# Patient Record
Sex: FEMALE | Race: WHITE | NOT HISPANIC OR LATINO | Employment: STUDENT | URBAN - METROPOLITAN AREA
[De-identification: names, ages, dates, MRNs, and addresses within clinical notes are randomized per-mention and may not be internally consistent; named-entity substitution may affect disease eponyms.]

---

## 2017-03-01 ENCOUNTER — TRANSFERRED RECORDS (OUTPATIENT)
Dept: HEALTH INFORMATION MANAGEMENT | Facility: CLINIC | Age: 22
End: 2017-03-01

## 2017-03-01 LAB — PAP SMEAR - HIM PATIENT REPORTED: NEGATIVE

## 2017-03-13 ENCOUNTER — OFFICE VISIT (OUTPATIENT)
Dept: URGENT CARE | Facility: URGENT CARE | Age: 22
End: 2017-03-13
Payer: COMMERCIAL

## 2017-03-13 VITALS
SYSTOLIC BLOOD PRESSURE: 108 MMHG | DIASTOLIC BLOOD PRESSURE: 54 MMHG | TEMPERATURE: 98.8 F | HEART RATE: 86 BPM | RESPIRATION RATE: 16 BRPM | OXYGEN SATURATION: 98 %

## 2017-03-13 DIAGNOSIS — J01.90 ACUTE SINUSITIS WITH SYMPTOMS > 10 DAYS: Primary | ICD-10-CM

## 2017-03-13 PROCEDURE — 99213 OFFICE O/P EST LOW 20 MIN: CPT | Performed by: PHYSICIAN ASSISTANT

## 2017-03-13 NOTE — NURSING NOTE
"Chief Complaint   Patient presents with     Urgent Care     Cough     with sinus drainage for 1 month     Fatigue      Initial /54  Pulse 86  Temp 98.8  F (37.1  C)  Resp 16  SpO2 98% Estimated body mass index is 27.55 kg/(m^2) as calculated from the following:    Height as of 6/26/16: 5' 2\" (1.575 m).    Weight as of 10/23/16: 150 lb 9.6 oz (68.3 kg)..  BP completed using cuff size: regular  S ZAINAB, CMA    "

## 2017-03-13 NOTE — MR AVS SNAPSHOT
After Visit Summary   3/13/2017    Clayton العلي    MRN: [id number]           Patient Information     Date Of Birth          1995        Visit Information        Provider Department      3/13/2017 6:30 PM Chiquita Samson PA-C [facility]        Today's Diagnoses     Acute sinusitis with symptoms > 10 days    -  1       Follow-ups after your visit        Who to contact     If you have questions or need follow up information about today's clinic visit or your schedule please contact [facility] directly at [phone].  Normal or non-critical lab and imaging results will be communicated to you by [employer], letter or phone within 4 business days after the clinic has received the results. If you do not hear from us within 7 days, please contact the clinic through [employer] or phone. If you have a critical or abnormal lab result, we will notify you by phone as soon as possible.  Submit refill requests through [employer] or call your pharmacy and they will forward the refill request to us. Please allow 3 business days for your refill to be completed.          Additional Information About Your Visit        MyChart Information     [employer] gives you secure access to your electronic health record. If you see a primary care provider, you can also send messages to your care team and make appointments. If you have questions, please call your primary care clinic.  If you do not have a primary care provider, please call [phone] and they will assist you.        Care EveryWhere ID     This is your Care EveryWhere ID. This could be used by other organizations to access your [place] medical records  [id number]        Your Vitals Were     Pulse Temperature Respirations Pulse Oximetry          86 98.8  F (37.1  C) 16 98%         Blood Pressure from Last 3 Encounters:   03/13/17 108/54   10/23/16 116/60   06/26/16 120/66    Weight from Last 3 Encounters:   10/23/16 150 lb 9.6  oz (68.3 kg)   06/26/16 150 lb (68 kg)   01/05/15 152 lb (68.9 kg) (84 %)*     * Growth percentiles are based on [facility] 2-20 Years data.              Today, you had the following     No orders found for display         Today's Medication Changes          These changes are accurate as of: 3/13/17 11:59 PM.  If you have any questions, ask your nurse or doctor.               Start taking these medicines.        Dose/Directions    amoxicillin-clavulanate 875-125 MG per tablet   Commonly known as:  AUGMENTIN   Used for:  Acute sinusitis with symptoms > 10 days   Started by:  Chiquita Samson PA-C        Dose:  1 tablet   Take 1 tablet by mouth 2 times daily   Quantity:  20 tablet   Refills:  0            Where to get your medicines      These medications were sent to [facility] - [address]   [address]  [address], [place] [address]     Phone:  [phone]     amoxicillin-clavulanate 875-125 MG per tablet                Primary Care Provider    Physician No Ref-Primary       No address on file        Thank you!     Thank you for choosing [facility]RG[facility]  for your care. Our goal is always to provide you with excellent care. Hearing back from our patients is one way we can continue to improve our services. Please take a few minutes to complete the written survey that you may receive in the mail after your visit with us. Thank you!             Your Updated Medication List - Protect others around you: Learn how to safely use, store and throw away your medicines at www.disposemymeds.org.          This list is accurate as of: 3/13/17 11:59 PM.  Always use your most recent med list.                   Brand Name Dispense Instructions for use    amoxicillin-clavulanate 875-125 MG per tablet    AUGMENTIN    20 tablet    Take 1 tablet by mouth 2 times daily       SERTRALINE HCL PO      Take 100 mg by mouth daily       sodium chloride 0.65 % nasal spray    OCEAN      Spray 1 spray into both nostrils daily as needed for congestion       SHALINI PO      Take 1 tablet by mouth At Bedtime

## 2017-03-30 NOTE — PROGRESS NOTES
SUBJECTIVE:  Clayton العلي is a 21 year old female here with concerns about sinus infection.  She states onset of symptoms were 1 month(s) ago.  She has had maxillary pressure. Course of illness is worsening. Severity moderate  Current and Associated symptoms: nasal congestion, rhinorrhea, facial pain/pressure, headache, post-nasal drainage, nausea and hot and cold flashes  Predisposing factors include none. Recent treatment has included: Decongestants and OTC meds    No past medical history on file.  Social History   Substance Use Topics     Smoking status: Never Smoker     Smokeless tobacco: Never Used     Alcohol use No       ROS:  Review of systems negative except as stated above.    OBJECTIVE:  /54  Pulse 86  Temp 98.8  F (37.1  C)  Resp 16  SpO2 98%  Exam:GENERAL APPEARANCE: healthy, alert and no distress  EYES: EOMI,  PERRL, conjunctiva clear  HENT: TM's normal bilaterally, rhinorrhea yellow, green, thick and purulent, oral mucous membranes moist, no erythema noted and maxillary sinus tenderness and thick PND noted.    NECK: supple, nontender, no lymphadenopathy  RESP: lungs clear to auscultation - no rales, rhonchi or wheezes  CV: regular rates and rhythm, normal S1 S2, no murmur noted  NEURO: Normal strength and tone, sensory exam grossly normal,  normal speech and mentation  SKIN: no suspicious lesions or rashes    assessment/plan:  (J01.90) Acute sinusitis with symptoms > 10 days  (primary encounter diagnosis)  Comment:   Plan: amoxicillin-clavulanate (AUGMENTIN) 875-125 MG         per tablet         Med as directed and OTC med for sx relief.  Hot packs to face, steam and increase fluids.  FU with PCP as needed if sx worsen or new sx develop.

## 2017-06-10 ENCOUNTER — HEALTH MAINTENANCE LETTER (OUTPATIENT)
Age: 22
End: 2017-06-10

## 2017-06-19 ENCOUNTER — OFFICE VISIT (OUTPATIENT)
Dept: INTERNAL MEDICINE | Facility: CLINIC | Age: 22
End: 2017-06-19
Payer: COMMERCIAL

## 2017-06-19 VITALS
SYSTOLIC BLOOD PRESSURE: 100 MMHG | DIASTOLIC BLOOD PRESSURE: 60 MMHG | TEMPERATURE: 98.3 F | HEART RATE: 84 BPM | BODY MASS INDEX: 28.91 KG/M2 | WEIGHT: 157.1 LBS | OXYGEN SATURATION: 99 % | HEIGHT: 62 IN

## 2017-06-19 DIAGNOSIS — J30.81 NON-SEASONAL ALLERGIC RHINITIS DUE TO ANIMAL HAIR AND DANDER: Primary | ICD-10-CM

## 2017-06-19 DIAGNOSIS — J30.2 SEASONAL ALLERGIC RHINITIS, UNSPECIFIED ALLERGIC RHINITIS TRIGGER: ICD-10-CM

## 2017-06-19 PROCEDURE — 99213 OFFICE O/P EST LOW 20 MIN: CPT | Performed by: INTERNAL MEDICINE

## 2017-06-19 RX ORDER — AZELASTINE HYDROCHLORIDE 0.5 MG/ML
1 SOLUTION/ DROPS OPHTHALMIC 2 TIMES DAILY
Qty: 1 BOTTLE | Refills: 11 | Status: SHIPPED | OUTPATIENT
Start: 2017-06-19 | End: 2023-02-13

## 2017-06-19 RX ORDER — AZELASTINE 1 MG/ML
2 SPRAY, METERED NASAL DAILY
Qty: 1 BOTTLE | Refills: 11 | Status: SHIPPED | OUTPATIENT
Start: 2017-06-19 | End: 2018-11-04

## 2017-06-19 RX ORDER — FLUTICASONE PROPIONATE 50 MCG
2 SPRAY, SUSPENSION (ML) NASAL DAILY
Qty: 1 BOTTLE | Refills: 11 | Status: SHIPPED | OUTPATIENT
Start: 2017-06-19 | End: 2018-11-04

## 2017-06-19 NOTE — PATIENT INSTRUCTIONS
Try switching to Allegra (fexofenadine) daily for next 2 weeks. Over the counter.     Azelastine (anti-histamine) nasal spray 2 sprays/nostril daily. Prescription sent to pharmacy.    Fluticasone (anti-inflammatory) nasal spray 2 sprays/nostril daily. Prescription sent to pharmacy.    Azelastine eye drops: 1 drop/eye twice a day. Prescription sent to pharmacy.    ---    If no improvement in symptoms after 2 weeks, let me know.    Next step is to add Singulair.

## 2017-06-19 NOTE — MR AVS SNAPSHOT
After Visit Summary   6/19/2017    Clayton العلي    MRN: [id number]           Patient Information     Date Of Birth          1995        Visit Information        Provider Department      6/19/2017 10:00 AM Syl Don MD [facility]        Today's Diagnoses     Non-seasonal allergic rhinitis due to animal hair and dander    -  1    Seasonal allergic rhinitis, unspecified allergic rhinitis trigger          Care Instructions    Try switching to Allegra (fexofenadine) daily for next 2 weeks. Over the counter.     Azelastine (anti-histamine) nasal spray 2 sprays/nostril daily. Prescription sent to pharmacy.    Fluticasone (anti-inflammatory) nasal spray 2 sprays/nostril daily. Prescription sent to pharmacy.    Azelastine eye drops: 1 drop/eye twice a day. Prescription sent to pharmacy.    ---    If no improvement in symptoms after 2 weeks, let me know.    Next step is to add Singulair.           Follow-ups after your visit        Who to contact     If you have questions or need follow up information about today's clinic visit or your schedule please contact [facility] directly at [phone].  Normal or non-critical lab and imaging results will be communicated to you by [employer], letter or phone within 4 business days after the clinic has received the results. If you do not hear from us within 7 days, please contact the clinic through [employer] or phone. If you have a critical or abnormal lab result, we will notify you by phone as soon as possible.  Submit refill requests through [employer] or call your pharmacy and they will forward the refill request to us. Please allow 3 business days for your refill to be completed.          Additional Information About Your Visit        MyChart Information     [employer] gives you secure access to your electronic health record. If you see a primary care provider, you can also send messages to your care team and  "make appointments. If you have questions, please call your primary care clinic.  If you do not have a primary care provider, please call [phone] and they will assist you.        Care EveryWhere ID     This is your Care EveryWhere ID. This could be used by other organizations to access your [place] medical records  [id number]        Your Vitals Were     Pulse Temperature Height Last Period Pulse Oximetry Breastfeeding?    84 98.3  F (36.8  C) (Oral) 5' 2\" (1.575 m) 06/13/2017 (Exact Date) 99% No    BMI (Body Mass Index)                   28.73 kg/m2            Blood Pressure from Last 3 Encounters:   06/19/17 100/60   03/13/17 108/54   10/23/16 116/60    Weight from Last 3 Encounters:   06/19/17 157 lb 1.6 oz (71.3 kg)   10/23/16 150 lb 9.6 oz (68.3 kg)   06/26/16 150 lb (68 kg)              Today, you had the following     No orders found for display         Today's Medication Changes          These changes are accurate as of: 6/19/17 10:26 AM.  If you have any questions, ask your nurse or doctor.               Start taking these medicines.        Dose/Directions    azelastine 0.05 % Soln ophthalmic solution   Commonly known as:  OPTIVAR   Used for:  Non-seasonal allergic rhinitis due to animal hair and dander, Seasonal allergic rhinitis, unspecified allergic rhinitis trigger   Started by:  Syl Don MD        Dose:  1 drop   Place 1 drop into both eyes 2 times daily   Quantity:  1 Bottle   Refills:  11       azelastine 0.1 % spray   Commonly known as:  ASTELIN   Used for:  Non-seasonal allergic rhinitis due to animal hair and dander, Seasonal allergic rhinitis, unspecified allergic rhinitis trigger   Started by:  Syl Don MD        Dose:  2 spray   Spray 2 sprays into both nostrils daily   Quantity:  1 Bottle   Refills:  11       fluticasone 50 MCG/ACT spray   Commonly known as:  FLONASE   Used for:  Non-seasonal allergic rhinitis due to animal hair and dander, Seasonal allergic rhinitis, " unspecified allergic rhinitis trigger   Started by:  Syl Don MD        Dose:  2 spray   Spray 2 sprays into both nostrils daily   Quantity:  1 Bottle   Refills:  11            Where to get your medicines      These medications were sent to [id number] - [facility] - [address] AT [facility]  [address] [address]     Phone:  [phone]     azelastine 0.05 % Soln ophthalmic solution    azelastine 0.1 % spray    fluticasone 50 MCG/ACT spray                Primary Care Provider    Physician No Ref-Primary       No address on file        Thank you!     Thank you for choosing [facility]  for your care. Our goal is always to provide you with excellent care. Hearing back from our patients is one way we can continue to improve our services. Please take a few minutes to complete the written survey that you may receive in the mail after your visit with us. Thank you!             Your Updated Medication List - Protect others around you: Learn how to safely use, store and throw away your medicines at www.disposemymeds.org.          This list is accurate as of: 6/19/17 10:26 AM.  Always use your most recent med list.                   Brand Name Dispense Instructions for use    azelastine 0.05 % Soln ophthalmic solution    OPTIVAR    1 Bottle    Place 1 drop into both eyes 2 times daily       azelastine 0.1 % spray    ASTELIN    1 Bottle    Spray 2 sprays into both nostrils daily       fluticasone 50 MCG/ACT spray    FLONASE    1 Bottle    Spray 2 sprays into both nostrils daily       SERTRALINE HCL PO      Take 100 mg by mouth daily       sodium chloride 0.65 % nasal spray    OCEAN     Spray 1 spray into both nostrils daily as needed for congestion       SHALINI PO      Take 1 tablet by mouth At Bedtime

## 2017-06-19 NOTE — PROGRESS NOTES
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    Has never seen an allergist or undergone allergy testing.    No other active medical problems.    The medication, allergy, and problem lists have been reviewed and updated as appropriate.       OBJECTIVE:                                                      /60 (BP Location: Left arm, Patient Position: Chair, Cuff Size: Adult Regular)  Pulse 84  Temp 98.3  F (36.8  C) (Oral)  Ht 5' 2\" (1.575 m)  Wt 157 lb 1.6 oz (71.3 kg)  LMP 06/13/2017 (Exact Date)  SpO2 99%  Breastfeeding? No  BMI 28.73 kg/m2  Constitutional: well-appearing      ASSESSMENT/PLAN:                                                      (J30.81) Non-seasonal allergic rhinitis due to animal hair and dander  (primary encounter diagnosis)  (J30.2) Seasonal allergic rhinitis, unspecified allergic rhinitis trigger  Comment: allergy symptoms suboptimally controlled on Nasacort 1 spray/nostril daily and loratadine 10 mg daily.  Plan:    - STOP loratadine and Nasacort.   - START Allegra/fexofenadine daily.    - START fluticasone nasal spray 2 sprays/nostril daily.    - START azelastine nasal spray 2 sprays/nostril daily.    - START azelastine eyedrops 1 drop/eye b.i.d.    - if no improvement in symptoms after 2 weeks, patient to contact M.D.     - next step is the addition of Singulair.      - if still " no improvement, will refer to allergy for further evaluation and management.     The instructions on the AVS were discussed and explained to the patient. Patient expressed understanding of instructions.    Syl Don MD   [address] [address]  T: [phone], F: [phone]

## 2017-06-19 NOTE — NURSING NOTE
"Chief Complaint   Patient presents with     Allergies     Had it for a long time but worsening for the past month as coming in more contact with animals and planning to got to vet school.       Initial /60 (BP Location: Left arm, Patient Position: Chair, Cuff Size: Adult Regular)  Pulse 84  Temp 98.3  F (36.8  C) (Oral)  Ht 5' 2\" (1.575 m)  Wt 157 lb 1.6 oz (71.3 kg)  LMP 06/13/2017 (Exact Date)  SpO2 99%  Breastfeeding? No  BMI 28.73 kg/m2 Estimated body mass index is 28.73 kg/(m^2) as calculated from the following:    Height as of this encounter: 5' 2\" (1.575 m).    Weight as of this encounter: 157 lb 1.6 oz (71.3 kg).  Medication Reconciliation: complete     Kaminibose MA      "

## 2018-10-23 ENCOUNTER — OFFICE VISIT (OUTPATIENT)
Dept: FAMILY MEDICINE | Facility: CLINIC | Age: 23
End: 2018-10-23
Payer: COMMERCIAL

## 2018-10-23 VITALS
HEIGHT: 62 IN | WEIGHT: 177.3 LBS | OXYGEN SATURATION: 98 % | HEART RATE: 99 BPM | TEMPERATURE: 98.8 F | SYSTOLIC BLOOD PRESSURE: 131 MMHG | BODY MASS INDEX: 32.63 KG/M2 | DIASTOLIC BLOOD PRESSURE: 79 MMHG

## 2018-10-23 DIAGNOSIS — E66.9 OBESITY (BMI 30-39.9): ICD-10-CM

## 2018-10-23 DIAGNOSIS — J30.81 ALLERGIC RHINITIS DUE TO ANIMALS: ICD-10-CM

## 2018-10-23 DIAGNOSIS — F41.1 GAD (GENERALIZED ANXIETY DISORDER): Primary | ICD-10-CM

## 2018-10-23 DIAGNOSIS — Z23 NEED FOR PROPHYLACTIC VACCINATION AND INOCULATION AGAINST INFLUENZA: ICD-10-CM

## 2018-10-23 PROCEDURE — 90471 IMMUNIZATION ADMIN: CPT | Performed by: FAMILY MEDICINE

## 2018-10-23 PROCEDURE — 90686 IIV4 VACC NO PRSV 0.5 ML IM: CPT | Performed by: FAMILY MEDICINE

## 2018-10-23 PROCEDURE — 99214 OFFICE O/P EST MOD 30 MIN: CPT | Mod: 25 | Performed by: FAMILY MEDICINE

## 2018-10-23 RX ORDER — SERTRALINE HYDROCHLORIDE 100 MG/1
TABLET, FILM COATED ORAL
Qty: 90 TABLET | Refills: 1 | Status: SHIPPED | OUTPATIENT
Start: 2018-10-23 | End: 2019-02-26

## 2018-10-23 RX ORDER — MONTELUKAST SODIUM 10 MG/1
10 TABLET ORAL AT BEDTIME
Qty: 90 TABLET | Refills: 3 | Status: SHIPPED | OUTPATIENT
Start: 2018-10-23 | End: 2019-04-22

## 2018-10-23 ASSESSMENT — PATIENT HEALTH QUESTIONNAIRE - PHQ9: 5. POOR APPETITE OR OVEREATING: SEVERAL DAYS

## 2018-10-23 ASSESSMENT — ANXIETY QUESTIONNAIRES
IF YOU CHECKED OFF ANY PROBLEMS ON THIS QUESTIONNAIRE, HOW DIFFICULT HAVE THESE PROBLEMS MADE IT FOR YOU TO DO YOUR WORK, TAKE CARE OF THINGS AT HOME, OR GET ALONG WITH OTHER PEOPLE: SOMEWHAT DIFFICULT
6. BECOMING EASILY ANNOYED OR IRRITABLE: MORE THAN HALF THE DAYS
5. BEING SO RESTLESS THAT IT IS HARD TO SIT STILL: MORE THAN HALF THE DAYS
GAD7 TOTAL SCORE: 12
1. FEELING NERVOUS, ANXIOUS, OR ON EDGE: MORE THAN HALF THE DAYS
2. NOT BEING ABLE TO STOP OR CONTROL WORRYING: SEVERAL DAYS
3. WORRYING TOO MUCH ABOUT DIFFERENT THINGS: MORE THAN HALF THE DAYS
7. FEELING AFRAID AS IF SOMETHING AWFUL MIGHT HAPPEN: MORE THAN HALF THE DAYS

## 2018-10-23 NOTE — Clinical Note
Please abstract the following data from this visit with this patient into the appropriate field in Epic: Pap smear done on this date: March 2017 (approximately), by this group: [facility] ([facility]), results were Normal.

## 2018-10-23 NOTE — MR AVS SNAPSHOT
After Visit Summary   10/23/2018    Clayton العلي    MRN: [id number]           Patient Information     Date Of Birth          1995        Visit Information        Provider Department      10/23/2018 9:40 AM Jaylin Ward,  [facility]        Today's Diagnoses     SERENA (generalized anxiety disorder)    -  1    Allergic rhinitis due to animals        Obesity (BMI 30-39.9)          Care Instructions      Weight Management: Getting Started  Healthy bodies come in all shapes and sizes. Not all bodies are made to be thin. For some people, a healthy weight is higher than the average weight listed on weight charts. Your healthcare provider can help you decide on a healthy weight for you.    Reasons to lose weight  Losing weight can help with some health problems, such as high blood pressure, heart disease, diabetes, sleep apnea, and arthritis. You may also feel more energy.  Set your long-term goal  Your goal doesn't even have to be a specific weight. You may decide on a fitness goal (such as being able to walk 10 miles a week), or a health goal (such as lowering your blood pressure). Choose a goal that is measurable and reasonable, so you know when you've reached it. A goal of reaching a BMI of less than 25 is not always reasonable (or possible).   Make an action plan  Habits don t change overnight. Setting your goals too high can leave you feeling discouraged if you can t reach them. Be realistic. Choose one or two small changes you can make now. Set an action plan for how you are going to make these changes. When you can stick to this plan, keep making a few more small changes. Taking small steps will help you stay on the path to success.  Track your progress  Write down your goals. Then, keep a daily record of your progress. Write down what you eat and how active you are. This record lets you look back on how much you ve done. It may also help when you re feeling frustrated.  Reward yourself for success. Even if you don t reach every goal, give yourself credit for what you do get done.  Get support  Encouragement from others can help make losing weight easier. Ask your family members and friends for support. They may even want to join you. Also look to your healthcare provider, registered dietitian, and  for help. Your local hospital can give you more information about nutrition, exercise, and weight loss.  Date Last Reviewed: 1/31/2016 2000-2017 [employer]. [address] [place] [address]. All rights reserved. This information is not intended as a substitute for professional medical care. Always follow your healthcare professional's instructions.                Follow-ups after your visit        Additional Services     ALLERGY/ASTHMA ADULT REFERRAL       Your provider has referred you to: N: [facility] [phone]   [email or web address]    Please be aware that coverage of these services is subject to the terms and limitations of your health insurance plan.  Call member services at your health plan with any benefit or coverage questions.      Please bring the following with you to your appointment:    (1) Any X-Rays, CTs or MRIs which have been performed.  Contact the facility where they were done to arrange for  prior to your scheduled appointment.    (2) List of current medications  (3) This referral request   (4) Any documents/labs given to you for this referral                  Follow-up notes from your care team     Return in about 3 months (around 1/23/2019).      Who to contact     If you have questions or need follow up information about today's clinic visit or your schedule please contact [facility] directly at [phone].  Normal or non-critical lab and imaging results will be communicated to you by MyChart, letter or phone within 4 business days after the  "clinic has received the results. If you do not hear from us within 7 days, please contact the clinic through [employer] or phone. If you have a critical or abnormal lab result, we will notify you by phone as soon as possible.  Submit refill requests through [employer] or call your pharmacy and they will forward the refill request to us. Please allow 3 business days for your refill to be completed.          Additional Information About Your Visit        [employer] Information     [employer] gives you secure access to your electronic health record. If you see a primary care provider, you can also send messages to your care team and make appointments. If you have questions, please call your primary care clinic.  If you do not have a primary care provider, please call [phone] and they will assist you.        Care EveryWhere ID     This is your Care EveryWhere ID. This could be used by other organizations to access your [facility] medical records  [id number]        Your Vitals Were     Pulse Temperature Height Last Period Pulse Oximetry Breastfeeding?    99 98.8  F (37.1  C) (Oral) 5' 2.48\" (1.587 m) 10/01/2018 (Approximate) 98% No    BMI (Body Mass Index)                   31.93 kg/m2            Blood Pressure from Last 3 Encounters:   10/23/18 131/79   06/19/17 100/60   03/13/17 108/54    Weight from Last 3 Encounters:   10/23/18 177 lb 4.8 oz (80.4 kg)   06/19/17 157 lb 1.6 oz (71.3 kg)   10/23/16 150 lb 9.6 oz (68.3 kg)              We Performed the Following     ALLERGY/ASTHMA ADULT REFERRAL          Today's Medication Changes          These changes are accurate as of 10/23/18 10:38 AM.  If you have any questions, ask your nurse or doctor.               Start taking these medicines.        Dose/Directions    montelukast 10 MG tablet   Commonly known as:  SINGULAIR   Used for:  Allergic rhinitis due to animals   Started by:  Jaylin Ward,         Dose:  10 mg   Take 1 tablet (10 mg) by mouth At Bedtime   Quantity:  " 90 tablet   Refills:  3         These medicines have changed or have updated prescriptions.        Dose/Directions    sertraline 100 MG tablet   Commonly known as:  ZOLOFT   This may have changed:    - medication strength  - how much to take  - how to take this  - when to take this  - additional instructions   Used for:  SERENA (generalized anxiety disorder)   Changed by:  Jaylin Ward, DO        Start by taking 1/2 tablet daily for a week, then increase to 100mg daily   Quantity:  90 tablet   Refills:  1            Where to get your medicines      These medications were sent to [id number] - [place], [place] - [address] AT [facility]  [address], [place] [address]     Phone:  [phone]     montelukast 10 MG tablet    sertraline 100 MG tablet                Primary Care Provider Fax #    Physician No Ref-Primary [phone]       No address on file        Equal Access to Services     [facility] : Hadsusan singh Soraymond, waaxzabrina luqdia, qaybta kaalmada magy, mandi richmond . So [facility] [phone].    ATENCIÓN: Si habla español, tiene a nolasco disposición servicios gratuitos de asistencia lingüística. Llame al [phone].    We comply with applicable federal civil rights laws and [place] laws. We do not discriminate on the basis of race, color, national origin, age, disability, sex, sexual orientation, or gender identity.            Thank you!     Thank you for choosing [facility]  for your care. Our goal is always to provide you with excellent care. Hearing back from our patients is one way we can continue to improve our services. Please take a few minutes to complete the written survey that you may receive in the mail after your visit with us. Thank you!             Your Updated Medication List - Protect others around you: Learn how to safely use, store and throw away your medicines at www.disposemymeds.org.           This list is accurate as of 10/23/18 10:38 AM.  Always use your most recent med list.                   Brand Name Dispense Instructions for use Diagnosis    azelastine 0.05 % ophthalmic solution    OPTIVAR    1 Bottle    Place 1 drop into both eyes 2 times daily    Non-seasonal allergic rhinitis due to animal hair and dander, Seasonal allergic rhinitis, unspecified allergic rhinitis trigger       azelastine 0.1 % nasal spray    ASTELIN    1 Bottle    Spray 2 sprays into both nostrils daily    Non-seasonal allergic rhinitis due to animal hair and dander, Seasonal allergic rhinitis, unspecified allergic rhinitis trigger       fluticasone 50 MCG/ACT spray    FLONASE    1 Bottle    Spray 2 sprays into both nostrils daily    Non-seasonal allergic rhinitis due to animal hair and dander, Seasonal allergic rhinitis, unspecified allergic rhinitis trigger       montelukast 10 MG tablet    SINGULAIR    90 tablet    Take 1 tablet (10 mg) by mouth At Bedtime    Allergic rhinitis due to animals       sertraline 100 MG tablet    ZOLOFT    90 tablet    Start by taking 1/2 tablet daily for a week, then increase to 100mg daily    SERENA (generalized anxiety disorder)       sodium chloride 0.65 % nasal spray    OCEAN     Spray 1 spray into both nostrils daily as needed for congestion        SHALINI PO      Take 1 tablet by mouth At Bedtime

## 2018-10-23 NOTE — PROGRESS NOTES
SUBJECTIVE:   Clayton العلي is a 23 year old female who presents to clinic today for the following health issues:      Anxiety Follow-Up    Status since last visit: Improved     Other associated symptoms:None    Complicating factors:   Significant life event: No   Current substance abuse: None  Depression symptoms: Yes-    Patient was taking Zoloft 100mg daily in the past.  Stopped it in January since she was feeling better.  Anxiety worsened recently due to OTC weight loss supplements.  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    SERENA-7    Amount of exercise or physical activity: 2-3 days/week for an average of less than 15 minutes    Problems taking medications regularly: Yes,  problems remembering to take    Medication side effects: none    Diet: regular (no restrictions)        Discuss other options for congestion do to animal allergies.  History of significant allergies to animal dander.  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  Currently taking OTC Allegra, Astelin nasal spray and eye drops, Flonase, Saline nasal spray.    Discuss weight loss options.  She is currently taking an OTC weight loss supplement.  She doesn't have a lot of time to exercise due to working 10 hour days.        Problem list and histories reviewed & adjusted, as indicated.  Additional history: as documented    Patient Active Problem List   Diagnosis     SERENA (generalized anxiety disorder)     Allergic rhinitis due to animals     Obesity (BMI 30-39.9)     History reviewed. No pertinent surgical history.    Social History   Substance Use Topics     Smoking status: Never Smoker     Smokeless tobacco: Never Used     Alcohol use No     History reviewed. No pertinent family history.        Reviewed and updated as needed this visit by clinical staff  Tobacco  Allergies  Meds  Med Hx  Surg Hx  Fam Hx  Soc Hx      Reviewed and updated as needed  "this visit by Provider         ROS:  Constitutional, HEENT, cardiovascular, pulmonary, GI, , musculoskeletal, neuro, skin, endocrine and psych systems are negative, except as otherwise noted.    OBJECTIVE:     /79 (BP Location: Left arm, Patient Position: Sitting, Cuff Size: Adult Large)  Pulse 99  Temp 98.8  F (37.1  C) (Oral)  Ht 5' 2.48\" (1.587 m)  Wt 177 lb 4.8 oz (80.4 kg)  LMP 10/01/2018 (Approximate)  SpO2 98%  Breastfeeding? No  BMI 31.93 kg/m2  Body mass index is 31.93 kg/(m^2).  GENERAL: healthy, alert and no distress  EYES: Eyes grossly normal to inspection, PERRL and conjunctivae and sclerae normal  HENT: ear canals and TM's normal, nose and mouth without ulcers or lesions  NECK: no adenopathy, no asymmetry, masses, or scars and thyroid normal to palpation  RESP: lungs clear to auscultation - no rales, rhonchi or wheezes  CV: regular rate and rhythm, normal S1 S2, no S3 or S4, no murmur, click or rub, no peripheral edema and peripheral pulses strong  PSYCH: mentation appears normal, affect normal/bright    ASSESSMENT/PLAN:     1. SERENA (generalized anxiety disorder)  - Re-start Zoloft.  Has taken this before with good success   - sertraline (ZOLOFT) 100 MG tablet; Start by taking 1/2 tablet daily for a week, then increase to 100mg daily  Dispense: 90 tablet; Refill: 1    2. Allergic rhinitis due to animals  - Patient already taking multiple allergy meds with no relief  - Add Singulair  - Will give her the number for an allergist, but warned her that there might not be a lot they can do since she is constantly exposed to allergy triggers at work   - montelukast (SINGULAIR) 10 MG tablet; Take 1 tablet (10 mg) by mouth At Bedtime  Dispense: 90 tablet; Refill: 3  - ALLERGY/ASTHMA ADULT REFERRAL    3. Obesity (BMI 30-39.9)  - Advised against OTC weight loss supplements  - Work on calorie counting and regular exercise routine  - Offered health  vs endocrine vs weight loss center referral, " but pt refused    4. Need for prophylactic vaccination and inoculation against influenza  - FLU VACCINE, SPLIT VIRUS, IM (QUADRIVALENT) [72927]- >3 YRS  - Vaccine Administration, Initial [03311]    Follow up in 3 months     Jaylin Ward DO  [facility]

## 2018-10-24 ASSESSMENT — PATIENT HEALTH QUESTIONNAIRE - PHQ9: SUM OF ALL RESPONSES TO PHQ QUESTIONS 1-9: 8

## 2018-10-24 ASSESSMENT — ANXIETY QUESTIONNAIRES: GAD7 TOTAL SCORE: 12

## 2018-11-04 DIAGNOSIS — J30.2 SEASONAL ALLERGIC RHINITIS: ICD-10-CM

## 2018-11-04 DIAGNOSIS — J30.81 NON-SEASONAL ALLERGIC RHINITIS DUE TO ANIMAL HAIR AND DANDER: ICD-10-CM

## 2018-11-04 NOTE — LETTER
[facility]  [address] [id number] [phone]            Clayton العلي  [address] [place] [address]        November 6, 2018    Dear Clayton,    While refilling your prescription today, we noticed that you are due for an appointment with your provider.  We will refill your prescription for 30 days, but a follow-up appointment must be made before any additional refills can be approved.     Taking care of your health is important to us and we look forward to seeing you in the near future.  Please call us at [phone] or [phone] (or use [employer]) to schedule an appointment.     Please disregard this notice if you have already made an appointment.    Sincerely,        [facility]

## 2018-11-04 NOTE — TELEPHONE ENCOUNTER
"Requested Prescriptions   Pending Prescriptions Disp Refills     azelastine (ASTELIN) 0.1 % nasal spray [Pharmacy Med Name: AZELASTINE 0.1%(137MCG) NASAL-200SP] 30 mL 0    Last Written Prescription Date:  6/19/2017  Last Fill Quantity: 1,  # refills: 11   Last office visit: 6/19/2017 with prescribing provider:  6/19/2017   Future Office Visit:   Next 5 appointments (look out 90 days)     Jan 24, 2019  1:00 PM CST   SHORT with Susan Rachele Haase, APRN [facility]    [id number] [place] [id number] [phone]                  Sig: USE 2 SPRAYS IN EACH NOSTRIL DAILY    Antihistamines Protocol Failed    11/4/2018 12:59 PM       Failed - Recent (12 mo) or future (30 days) visit within the authorizing provider's specialty    Patient had office visit in the last 12 months or has a visit in the next 30 days with authorizing provider or within the authorizing provider's specialty.  See \"Patient Info\" tab in inbasket, or \"Choose Columns\" in Meds & Orders section of the refill encounter.             Passed - Patient is 3-64 years of age    Apply weight-based dosing for peds patients age 3 - 12 years of age.    Forward request to provider for patients under the age of 3 or over the age of 64.          fluticasone (FLONASE) 50 MCG/ACT spray [Pharmacy Med Name: FLUTICASONE 50MCG NASAL SP (120) RX] 16 mL 0    Last Written Prescription Date:  6/19/2017  Last Fill Quantity: 1,  # refills: 11   Last office visit: 6/19/2017 with prescribing provider:  6/19/2017   Future Office Visit:   Next 5 appointments (look out 90 days)     Jan 24, 2019  1:00 PM CST   SHORT with Susan Rachele Haase, APRN [facility]    [address] [facility] [address]   [phone]                  Sig: SHAKE LIQUID AND USE 2 SPRAYS IN EACH NOSTRIL DAILY    Inhaled Steroids Protocol Failed    11/4/2018 12:59 " "PM       Failed - Recent (12 mo) or future (30 days) visit within the authorizing provider's specialty    Patient had office visit in the last 12 months or has a visit in the next 30 days with authorizing provider or within the authorizing provider's specialty.  See \"Patient Info\" tab in inbasket, or \"Choose Columns\" in Meds & Orders section of the refill encounter.             Passed - Patient is age 12 or older          "

## 2018-11-06 RX ORDER — AZELASTINE 1 MG/ML
SPRAY, METERED NASAL
Qty: 30 ML | Refills: 0 | Status: SHIPPED | OUTPATIENT
Start: 2018-11-06 | End: 2019-04-22

## 2018-11-06 RX ORDER — FLUTICASONE PROPIONATE 50 MCG
SPRAY, SUSPENSION (ML) NASAL
Qty: 16 ML | Refills: 0 | Status: SHIPPED | OUTPATIENT
Start: 2018-11-06 | End: 2019-04-22

## 2018-11-06 NOTE — TELEPHONE ENCOUNTER
Medication is being filled for 1 time refill only due to:  Patient needs to be seen because it has been more than one year since last visit.   Letter sent

## 2019-02-18 ENCOUNTER — TELEPHONE (OUTPATIENT)
Dept: FAMILY MEDICINE | Facility: CLINIC | Age: 24
End: 2019-02-18

## 2019-02-18 NOTE — TELEPHONE ENCOUNTER
Panel Management Review      Patient has the following on her problem list: None      Composite cancer screening  Chart review shows that this patient is due/due soon for the following None  Summary:    Patient is due/failing the following:   CHLAMYDIA SCREENING and PHYSICAL    Action needed:   NEEDS APPT FOR PX    Type of outreach:    OUTREACH PT    Questions for provider review:    None                                                                                                                                    Shalini Ross CMA       Chart routed to PANEL POOL .

## 2019-02-18 NOTE — LETTER
[address] [id number] [phone]  March 15, 2019    Clayton العلي  [address] [place] [address]    Dear Clayton,    I care about your health and have reviewed your health plan. I have reviewed your medical conditions, medication list, and lab results and am making recommendations based on this review, to better manage your health.    You are in particular need of attention regarding:  -Wellness (Physical) Visit       Here is a list of Health Maintenance topics that are due now or due soon:  Health Maintenance Due   Topic Date Due     HIV SCREEN (SYSTEM ASSIGNED)  03/31/2014     PREVENTIVE CARE VISIT  06/18/2014     CHLAMYDIA SCREENING  03/01/2018       Please call us at [phone] (or use [employer]) to address the above recommendations.     Thank you for trusting [facility] and we appreciate the opportunity to serve you.  We look forward to supporting your healthcare needs in the future.    Healthy Regards,    [facility]

## 2019-02-25 NOTE — PROGRESS NOTES
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   Pt is going to  [place], leaving end of April 2019.   Pt is needing vaccines updated or titers drawn, as well as TB and some other testing.    She feels well overall  No concerns    Problem list and histories reviewed & adjusted, as indicated.  Additional history: as documented    Patient Active Problem List   Diagnosis     SERENA (generalized anxiety disorder)     Allergic rhinitis due to animals     Obesity (BMI 30-39.9)     History reviewed. No pertinent surgical history.    Social History     Tobacco Use     Smoking status: Never Smoker     Smokeless tobacco: Never Used   Substance Use Topics     Alcohol use: No     History reviewed. No pertinent family history.        Reviewed and updated as needed this visit by clinical staff       Reviewed and updated as needed this visit by Provider         ROS:  Constitutional, HEENT, cardiovascular, pulmonary, gi and gu systems are negative, except as otherwise noted.    OBJECTIVE:     /62   Pulse 108   Temp 98.6  F (37  C) (Tympanic)   Resp 16   Ht 1.588 m (5' 2.5\")   Wt 77.6 kg (171 lb 1.6 oz)   SpO2 98%   BMI 30.80 kg/m    Body mass index is 30.8 kg/m .  GENERAL: healthy, alert and no distress  PSYCH: mentation appears normal, affect normal/bright    Diagnostic Test Results:  See A/P    ASSESSMENT/PLAN:   1. Immunization counseling  Updated screening for form completion. No other concerns at this time.  - Quantiferon TB Gold Plus  - Treponema Abs w Reflex to RPR and Titer    2. Encounter for completion of form with patient  Will have patient  once complete. Did provide her with a copy of her current immunization status      SHE would like to follow up with Dr. Ward to refill her zoloft so I will hold on refills today.      Pramod Song PA-C  [facility]  "

## 2019-02-26 ENCOUNTER — OFFICE VISIT (OUTPATIENT)
Dept: FAMILY MEDICINE | Facility: CLINIC | Age: 24
End: 2019-02-26
Payer: COMMERCIAL

## 2019-02-26 VITALS
SYSTOLIC BLOOD PRESSURE: 115 MMHG | HEIGHT: 63 IN | RESPIRATION RATE: 16 BRPM | WEIGHT: 171.1 LBS | HEART RATE: 108 BPM | OXYGEN SATURATION: 98 % | TEMPERATURE: 98.6 F | DIASTOLIC BLOOD PRESSURE: 62 MMHG | BODY MASS INDEX: 30.32 KG/M2

## 2019-02-26 DIAGNOSIS — Z02.89 ENCOUNTER FOR COMPLETION OF FORM WITH PATIENT: ICD-10-CM

## 2019-02-26 DIAGNOSIS — Z71.85 IMMUNIZATION COUNSELING: Primary | ICD-10-CM

## 2019-02-26 PROCEDURE — 99213 OFFICE O/P EST LOW 20 MIN: CPT | Performed by: PHYSICIAN ASSISTANT

## 2019-02-26 PROCEDURE — 86480 TB TEST CELL IMMUN MEASURE: CPT | Performed by: PHYSICIAN ASSISTANT

## 2019-02-26 PROCEDURE — 36415 COLL VENOUS BLD VENIPUNCTURE: CPT | Performed by: PHYSICIAN ASSISTANT

## 2019-02-26 PROCEDURE — 0064U ANTB TP TOTAL&RPR IA QUAL: CPT | Performed by: PHYSICIAN ASSISTANT

## 2019-02-26 RX ORDER — SERTRALINE HYDROCHLORIDE 100 MG/1
50 TABLET, FILM COATED ORAL DAILY
Qty: 90 TABLET | Refills: 0
Start: 2019-02-26 | End: 2019-04-22

## 2019-02-26 ASSESSMENT — MIFFLIN-ST. JEOR: SCORE: 1497.29

## 2019-02-26 NOTE — NURSING NOTE
Pt had annual C/G screening done at the [facility] in March of 2018, negative results.

## 2019-02-27 LAB
GAMMA INTERFERON BACKGROUND BLD IA-ACNC: 0.05 IU/ML
M TB IFN-G BLD-IMP: NEGATIVE
M TB IFN-G CD4+ BCKGRND COR BLD-ACNC: >10 IU/ML
MITOGEN IGNF BCKGRD COR BLD-ACNC: 0 IU/ML
MITOGEN IGNF BCKGRD COR BLD-ACNC: 0 IU/ML
T PALLIDUM AB SER QL: NONREACTIVE

## 2019-03-15 NOTE — TELEPHONE ENCOUNTER
Type of outreach:  Phone, left message for patient to call back.  and Sent letter. Ashley VÁZQUEZ

## 2019-04-21 ASSESSMENT — ENCOUNTER SYMPTOMS
NAUSEA: 0
HEARTBURN: 0
ARTHRALGIAS: 0
HEADACHES: 0
HEMATURIA: 0
PARESTHESIAS: 0
MYALGIAS: 0
FREQUENCY: 0
DYSURIA: 0
FEVER: 0
SHORTNESS OF BREATH: 0
ABDOMINAL PAIN: 1
DIARRHEA: 0
CONSTIPATION: 0
HEMATOCHEZIA: 0
BREAST MASS: 0
JOINT SWELLING: 0
EYE PAIN: 0
PALPITATIONS: 0
DIZZINESS: 0
CHILLS: 0
WEAKNESS: 0
NERVOUS/ANXIOUS: 1
COUGH: 0

## 2019-04-22 ENCOUNTER — OFFICE VISIT (OUTPATIENT)
Dept: FAMILY MEDICINE | Facility: CLINIC | Age: 24
End: 2019-04-22
Payer: COMMERCIAL

## 2019-04-22 VITALS
WEIGHT: 174.2 LBS | TEMPERATURE: 98.7 F | OXYGEN SATURATION: 99 % | BODY MASS INDEX: 31.35 KG/M2 | SYSTOLIC BLOOD PRESSURE: 118 MMHG | HEART RATE: 96 BPM | DIASTOLIC BLOOD PRESSURE: 72 MMHG | RESPIRATION RATE: 16 BRPM

## 2019-04-22 DIAGNOSIS — J30.81 NON-SEASONAL ALLERGIC RHINITIS DUE TO ANIMAL HAIR AND DANDER: ICD-10-CM

## 2019-04-22 DIAGNOSIS — F41.1 GAD (GENERALIZED ANXIETY DISORDER): ICD-10-CM

## 2019-04-22 DIAGNOSIS — Z00.00 ROUTINE HISTORY AND PHYSICAL EXAMINATION OF ADULT: Primary | ICD-10-CM

## 2019-04-22 DIAGNOSIS — Z30.09 ENCOUNTER FOR OTHER GENERAL COUNSELING OR ADVICE ON CONTRACEPTION: ICD-10-CM

## 2019-04-22 DIAGNOSIS — J30.81 ALLERGIC RHINITIS DUE TO ANIMALS: ICD-10-CM

## 2019-04-22 PROCEDURE — 87591 N.GONORRHOEAE DNA AMP PROB: CPT | Performed by: NURSE PRACTITIONER

## 2019-04-22 PROCEDURE — 99214 OFFICE O/P EST MOD 30 MIN: CPT | Mod: 25 | Performed by: NURSE PRACTITIONER

## 2019-04-22 PROCEDURE — 99395 PREV VISIT EST AGE 18-39: CPT | Performed by: NURSE PRACTITIONER

## 2019-04-22 PROCEDURE — 87491 CHLMYD TRACH DNA AMP PROBE: CPT | Performed by: NURSE PRACTITIONER

## 2019-04-22 RX ORDER — SERTRALINE HYDROCHLORIDE 100 MG/1
50 TABLET, FILM COATED ORAL DAILY
Qty: 90 TABLET | Refills: 1 | Status: SHIPPED | OUTPATIENT
Start: 2019-04-22 | End: 2019-12-16

## 2019-04-22 RX ORDER — MONTELUKAST SODIUM 10 MG/1
10 TABLET ORAL AT BEDTIME
Qty: 90 TABLET | Refills: 3 | Status: SHIPPED | OUTPATIENT
Start: 2019-04-22 | End: 2019-12-16

## 2019-04-22 RX ORDER — AZELASTINE 1 MG/ML
SPRAY, METERED NASAL
Qty: 30 ML | Refills: 11 | Status: SHIPPED | OUTPATIENT
Start: 2019-04-22 | End: 2023-02-13

## 2019-04-22 RX ORDER — AZELASTINE HYDROCHLORIDE 0.5 MG/ML
1 SOLUTION/ DROPS OPHTHALMIC 2 TIMES DAILY
Qty: 1 BOTTLE | Refills: 11 | Status: CANCELLED | OUTPATIENT
Start: 2019-04-22

## 2019-04-22 RX ORDER — FLUTICASONE PROPIONATE 50 MCG
SPRAY, SUSPENSION (ML) NASAL
Qty: 16 ML | Refills: 11 | Status: SHIPPED | OUTPATIENT
Start: 2019-04-22 | End: 2023-02-13

## 2019-04-22 ASSESSMENT — ENCOUNTER SYMPTOMS
DIARRHEA: 0
CONSTIPATION: 0
MYALGIAS: 0
JOINT SWELLING: 0
EYE PAIN: 0
FEVER: 0
PALPITATIONS: 0
BREAST MASS: 0
HEADACHES: 0
ABDOMINAL PAIN: 1
SHORTNESS OF BREATH: 0
ARTHRALGIAS: 0
DIZZINESS: 0
NERVOUS/ANXIOUS: 1
HEMATOCHEZIA: 0
FREQUENCY: 0
DYSURIA: 0
NAUSEA: 0
PARESTHESIAS: 0
HEARTBURN: 0
CHILLS: 0
WEAKNESS: 0
COUGH: 0
HEMATURIA: 0

## 2019-04-22 NOTE — PROGRESS NOTES
SUBJECTIVE:   CC: Clayton العلي is an 24 year old woman who presents for preventive health visit.     Healthy Habits:     Getting at least 3 servings of Calcium per day:  NO    Bi-annual eye exam:  NO    Dental care twice a year:  Yes    Sleep apnea or symptoms of sleep apnea:  None    Diet:  Regular (no restrictions) and Breakfast skipped    Frequency of exercise:  2-3 days/week    Duration of exercise:  15-30 minutes    Taking medications regularly:  No    Barriers to taking medications:  Problems remembering to take them    Medication side effects:  None and Other    PHQ-2 Total Score: 0    Additional concerns today:  Yes (discuss contraception, med refills)            Today's PHQ-2 Score:   PHQ-2 ( 1999 Pf[employer]) 4/21/2019   Q1: Little interest or pleasure in doing things 0   Q2: Feeling down, depressed or hopeless 0   PHQ-2 Score 0   Q1: Little interest or pleasure in doing things Not at all   Q2: Feeling down, depressed or hopeless Not at all   PHQ-2 Score 0       Abuse: Current or Past(Physical, Sexual or Emotional)- No  Do you feel safe in your environment? Yes    Social History     Tobacco Use     Smoking status: Never Smoker     Smokeless tobacco: Never Used   Substance Use Topics     Alcohol use: No     If you drink alcohol do you typically have >3 drinks per day or >7 drinks per week? No    Alcohol Use 4/21/2019   Prescreen: >3 drinks/day or >7 drinks/week? No       Reviewed orders with patient.  Reviewed health maintenance and updated orders accordingly - Yes      Mammogram not appropriate for this patient based on age.    Pertinent mammograms are reviewed under the imaging tab.  History of abnormal Pap smear: NO - age 21-29 PAP every 3 years recommended     Reviewed and updated as needed this visit by clinical staff         Reviewed and updated as needed this visit by Provider            Review of Systems   Constitutional: Negative for chills and fever.   HENT: Positive for congestion. Negative for  ear pain and hearing loss.    Eyes: Negative for pain and visual disturbance.   Respiratory: Negative for cough and shortness of breath.    Cardiovascular: Negative for chest pain, palpitations and peripheral edema.   Gastrointestinal: Positive for abdominal pain. Negative for constipation, diarrhea, heartburn, hematochezia and nausea.   Breasts:  Negative for tenderness, breast mass and discharge.   Genitourinary: Positive for vaginal bleeding. Negative for dysuria, frequency, genital sores, hematuria, pelvic pain, urgency and vaginal discharge.   Musculoskeletal: Negative for arthralgias, joint swelling and myalgias.   Skin: Negative for rash.   Neurological: Negative for dizziness, weakness, headaches and paresthesias.   Psychiatric/Behavioral: Negative for mood changes. The patient is nervous/anxious.           Here for physical.  Leaving to go to school out of the country.   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  She plans to return over winter breaks.  She needs refills on allergy medications.    She would also like to discuss contraception.  Interested in either IUD or Nexplanon.  Unsure which one.  Is not sexually active.  No history of blood clots, migraines.  Non smoker.    No fever, chills.  No sob, cp, palpitations.  No GI/ issues.    OBJECTIVE:   /72 (BP Location: Right arm, Patient Position: Chair, Cuff Size: Adult Regular)   Pulse 96   Temp 98.7  F (37.1  C) (Tympanic)   Resp 16   Wt 79 kg (174 lb 3.2 oz)   LMP 04/20/2019 (Exact Date)   SpO2 99%   BMI 31.35 kg/m    Physical Exam  GENERAL: healthy, alert and no distress  EYES: Eyes grossly normal to inspection, PERRL and conjunctivae and sclerae normal  HENT: ear canals and TM's normal, nose and mouth without ulcers or lesions  NECK: no adenopathy, no asymmetry, masses, or scars and thyroid normal to palpation  RESP: lungs clear to auscultation - no rales, rhonchi or wheezes  BREAST: normal without masses, tenderness or nipple  discharge and no palpable axillary masses or adenopathy  CV: regular rate and rhythm, normal S1 S2, no S3 or S4, no murmur, click or rub, no peripheral edema and peripheral pulses strong  ABDOMEN: soft, nontender, no hepatosplenomegaly, no masses and bowel sounds normal  MS: no gross musculoskeletal defects noted, no edema  SKIN: no suspicious lesions or rashes  NEURO: Normal strength and tone, mentation intact and speech normal  PSYCH: mentation appears normal, affect normal/bright    Diagnostic Test Results:  none     ASSESSMENT/PLAN:     1. Routine history and physical examination of adult  24 y.o female here for physical exam, medication refill, and contraceptive care prior to leaving the country for a veternary program. No complaints/issues. Plan to do pap when she returns during winter break, Dec 2019. Patient verbalized understanding.    2. Non-seasonal allergic rhinitis due to animal hair and dander  3. Allergic rhinitis due to animals  Allergic to animals, symptoms controlled with current medication regimen. Refilled.  - azelastine (ASTELIN) 0.1 % nasal spray; USE 2 SPRAYS IN EACH NOSTRIL DAILY  Dispense: 30 mL; Refill: 11  - fluticasone (FLONASE) 50 MCG/ACT nasal spray; SHAKE LIQUID AND USE 2 SPRAYS IN EACH NOSTRIL DAILY  Dispense: 16 mL; Refill: 11  - montelukast (SINGULAIR) 10 MG tablet; Take 1 tablet (10 mg) by mouth At Bedtime  Dispense: 90 tablet; Refill: 3    4. SERENA (generalized anxiety disorder)  Stable. Reporting good control of anxiety and no side effects. Refilled.  - sertraline (ZOLOFT) 100 MG tablet; Take 0.5 tablets (50 mg) by mouth daily  Dispense: 90 tablet; Refill: 1    5. Encounter for other general counseling or advice on contraception  Discussed IUD vs. Nexplanon. Pt interested in IUD placement, already made appointment for this upcoming Wednesday. Information given, discussed r/b/se. Patient verbalized understanding.   - Neisseria gonorrhoeae PCR  - Chlamydia trachomatis  "PCR        COUNSELING:  Reviewed preventive health counseling, as reflected in patient instructions    BP Readings from Last 1 Encounters:   02/26/19 115/62     Estimated body mass index is 30.8 kg/m  as calculated from the following:    Height as of 2/26/19: 1.588 m (5' 2.5\").    Weight as of 2/26/19: 77.6 kg (171 lb 1.6 oz).      Weight management plan: Discussed healthy diet and exercise guidelines     reports that she has never smoked. She has never used smokeless tobacco.      Counseling Resources:  ATP IV Guidelines  Pooled Cohorts Equation Calculator  Breast Cancer Risk Calculator  FRAX Risk Assessment  ICSI Preventive Guidelines  Dietary Guidelines for Americans, 2010  USDA's MyPlate  ASA Prophylaxis  Lung CA Screening    I have discussed the patient's presenting complaint(s) with the nurse practitioner student and agree with the history, physical exam, and plan as documented above. Her progress note reflects our assessment and plan.     MAVERICK Partida Ra, CNP  [facility]  "

## 2019-04-23 LAB
C TRACH DNA SPEC QL NAA+PROBE: NEGATIVE
N GONORRHOEA DNA SPEC QL NAA+PROBE: NEGATIVE
SPECIMEN SOURCE: NORMAL
SPECIMEN SOURCE: NORMAL

## 2019-04-24 ENCOUNTER — OFFICE VISIT (OUTPATIENT)
Dept: FAMILY MEDICINE | Facility: CLINIC | Age: 24
End: 2019-04-24
Payer: COMMERCIAL

## 2019-04-24 VITALS
OXYGEN SATURATION: 98 % | BODY MASS INDEX: 31.03 KG/M2 | TEMPERATURE: 98.4 F | SYSTOLIC BLOOD PRESSURE: 124 MMHG | WEIGHT: 172.4 LBS | RESPIRATION RATE: 16 BRPM | HEART RATE: 103 BPM | DIASTOLIC BLOOD PRESSURE: 74 MMHG

## 2019-04-24 DIAGNOSIS — Z30.017 INSERTION OF NEXPLANON: Primary | ICD-10-CM

## 2019-04-24 LAB — HCG UR QL: NEGATIVE

## 2019-04-24 PROCEDURE — 11981 INSERTION DRUG DLVR IMPLANT: CPT | Performed by: NURSE PRACTITIONER

## 2019-04-24 PROCEDURE — 99207 ZZC DROP WITH A PROCEDURE: CPT | Performed by: NURSE PRACTITIONER

## 2019-04-24 PROCEDURE — 81025 URINE PREGNANCY TEST: CPT | Performed by: NURSE PRACTITIONER

## 2019-04-24 NOTE — PROGRESS NOTES
SUBJECTIVE:   Clayton العلي is a 24 year old female who presents to clinic today for the following   health issues:      Patient is coming in for nexplanon insertion   Reviewed r/b/se.    Additional history: as documented    Reviewed  and updated as needed this visit by clinical staff         Reviewed and updated as needed this visit by Provider         Patient Active Problem List   Diagnosis     SERENA (generalized anxiety disorder)     Allergic rhinitis due to animals     Obesity (BMI 30-39.9)     History reviewed. No pertinent surgical history.    Social History     Tobacco Use     Smoking status: Never Smoker     Smokeless tobacco: Never Used   Substance Use Topics     Alcohol use: No     History reviewed. No pertinent family history.        ROS:  SEE HPI.    OBJECTIVE:     /74 (BP Location: Right arm, Patient Position: Chair, Cuff Size: Adult Regular)   Pulse 103   Temp 98.4  F (36.9  C) (Tympanic)   Resp 16   Wt 78.2 kg (172 lb 6.4 oz)   LMP 04/20/2019 (Exact Date)   SpO2 98%   BMI 31.03 kg/m    Body mass index is 31.03 kg/m .  GENERAL: healthy, alert and no distress  PSYCH: mentation appears normal, affect normal/bright    Diagnostic Test Results:  Results for orders placed or performed in visit on 04/24/19 (from the past 24 hour(s))   HCG Qual, Urine - CSC,  Range, Deshler  ([id number])   Result Value Ref Range    HCG Qual Urine Negative NEG^Negative       ASSESSMENT/PLAN:   1. Insertion of Nexplanon  Tolerated well.  See below.  - HCG Qual, Urine - CSC,  Range, Deshler  ([id number]); Future  - HCG Qual, Urine - CSC,  Range, Deshler  ([id number])  - etonogestrel (IMPLANON/NEXPLANON) subdermal implant 68 mg  - INSERTION NON-BIODEGRADABLE DRUG DELIVERY IMPLANT  - ETONOGESTREL IMPLANT SYSTEM    NEXPLANON PLACEMENT:    The patient meets and is agreeable to the following conditions:  She is not interested in conception in the near future.  There is no history of unresolved abnormal uterine  bleeding.  There is no history of an unresolved abnormal PAP smear.  She has no history of diabetes, AIDS, leukemia, IV drug use or chronic steroid use.  She denies the possibility of pregnancy. Patient's last menstrual period was 08/27/2016.  Pregnancy test today is negative.    The patient was given patient information on the Nexplanon and the patient education brochure from the . The patient has given consent to proceed with placement of the Nexplanon.  A written consent form is present in the chart.  She wishes to proceed.    PROCEDURE:  Type of Device: Nexplanon  The patient lied in supine with the full length of her arm exposed and supported by the pillow and table.   The positioning the upper inner aspect of her nondominant arm was bent at her elbow to 90 degrees and rotated upward and outward opposite her head.  The insertion site was Identified approximately four finger  breadths/ 8cm superior and lateral to the medial epicondyle of the humerus.  Skin was marked to help guide insertion. One bladimir is made where the bess will be inserted and a second bladimir is made a few centimeters proximal to the first bladimir to serve as a direction guide during insertion.  The arm was cleansed the insertion site with an antiseptic solution.  Anesthesia using Lidocaine 1% was injected into the dermis to raise a wheal along the planned track of the bess insertion needle.   The clear plastic needle cover was removed. The needle was placed against the insertion site holding the applicator at an angle 30 degrees to the skin. While applying counter traction to the skin around the insertion site, the skin was punctured with the needle tip. The applicator was the lowered so that it is parallel to the skin and then the needle was advanced in the subdermal connective tissue while lifting the skin with the tip of the needle. The needle was advanced to its full length under the skin. I unlocked the slider with downward finger  pressure on the lever, then moved the slider fully backward (distally) and removed the applicator.  Immediately after insertion, I palpated the skin to verify correct placement of the bess; both ends were palpable. Patient was also asked to feel her implant.  The site was wrapped with a pressure bandage.   Patient's User Card was completed and given to patient.     The patient tolerated this procedure without immediate complication.  The patient is to return or call immediately for any unexplained fever, redness, pain and swelling at the insertion site. Recommended to take Tylenol or NSAID for mild to moderate pain.    Patient was instructed may remove the pressure bandage in 24 hours and the small bandage over the insertion site after 3-5 days.   A completed the User Card was given to the patient to keep.     Information on Nexplanon was given to patient. She was instructed to watch for signs of infection such as redness, swelling, discharge, watch for increased bleeding, worsening pain and fever and to RTC ASAP. Questions and concerns were addressed.    MAVERICK Partida Ra, CNP  [facility]

## 2019-04-24 NOTE — LETTER
[facility]  [address] [address] [phone]    2019    Re: Clayton العلي   [place] [place] [id number] [phone] (home) [phone] (work)    : 1995      To Whom It May Concern:      Clayton العلي is under my medical supervision.  The following is a list of her current medications.      Current Outpatient Medications:      azelastine (ASTELIN) 0.1 % nasal spray, USE 2 SPRAYS IN EACH NOSTRIL DAILY, Disp: 30 mL, Rfl: 11     azelastine (OPTIVAR) 0.05 % SOLN ophthalmic solution, Place 1 drop into both eyes 2 times daily, Disp: 1 Bottle, Rfl: 11     fluticasone (FLONASE) 50 MCG/ACT nasal spray, SHAKE LIQUID AND USE 2 SPRAYS IN EACH NOSTRIL DAILY, Disp: 16 mL, Rfl: 11     montelukast (SINGULAIR) 10 MG tablet, Take 1 tablet (10 mg) by mouth At Bedtime, Disp: 90 tablet, Rfl: 3     sertraline (ZOLOFT) 100 MG tablet, Take 0.5 tablets (50 mg) by mouth daily, Disp: 90 tablet, Rfl: 1     sodium chloride (OCEAN) 0.65 % nasal spray, Spray 1 spray into both nostrils daily as needed for congestion, Disp: , Rfl:         Sincerely,        Jaymie FOSTER

## 2019-09-30 ENCOUNTER — HEALTH MAINTENANCE LETTER (OUTPATIENT)
Age: 24
End: 2019-09-30

## 2019-11-08 ENCOUNTER — MYC MEDICAL ADVICE (OUTPATIENT)
Dept: FAMILY MEDICINE | Facility: CLINIC | Age: 24
End: 2019-11-08

## 2019-12-13 NOTE — PROGRESS NOTES
Subjective     Clayton العلي is a 24 year old female who presents to clinic today for the following health issues:    Patient would like to discuss mole on inner left thigh.    HPI   Anxiety Follow-Up    How are you doing with your anxiety since your last visit? Improved     Are you having other symptoms that might be associated with anxiety? No    Have you had a significant life event? No     Are you feeling depressed? No    Do you have any concerns with your use of alcohol or other drugs? No    Social History     Tobacco Use     Smoking status: Never Smoker     Smokeless tobacco: Never Used   Substance Use Topics     Alcohol use: No     Drug use: No     SERENA-7 SCORE 10/23/2018 12/16/2019   Total Score 12 8     PHQ 10/23/2018 12/16/2019   PHQ-9 Total Score 8 10   Q9: Thoughts of better off dead/self-harm past 2 weeks Not at all Not at all           How many servings of fruits and vegetables do you eat daily?  0-1    On average, how many sweetened beverages do you drink each day (Examples: soda, juice, sweet tea, etc.  Do NOT count diet or artificially sweetened beverages)?   3  How many days per week do you miss taking your medication? 1    What makes it hard for you to take your medications?  remembering to take      Tolerates med without problem.  Interested in dose increase.  Anxiety related to school.    Patient Active Problem List   Diagnosis     SERENA (generalized anxiety disorder)     Allergic rhinitis due to animals     Obesity (BMI 30-39.9)     History reviewed. No pertinent surgical history.    Social History     Tobacco Use     Smoking status: Never Smoker     Smokeless tobacco: Never Used   Substance Use Topics     Alcohol use: No     History reviewed. No pertinent family history.          Reviewed and updated as needed this visit by Provider  Tobacco  Allergies  Meds  Problems  Med Hx  Surg Hx  Fam Hx         Review of Systems   SEE HPI.      Objective    /76 (BP Location: Right arm,  "Patient Position: Chair, Cuff Size: Adult Regular)   Pulse 108   Temp 99.6  F (37.6  C) (Tympanic)   Resp 16   Wt 77.7 kg (171 lb 6.4 oz)   LMP 08/16/2019 (Approximate)   SpO2 99%   BMI 30.85 kg/m    Body mass index is 30.85 kg/m .  Physical Exam   GENERAL: healthy, alert and no distress   (female): normal female external genitalia, normal urethral meatus, vaginal mucosa, normal cervix/adnexa/uterus without masses or discharge  PSYCH: mentation appears normal, affect normal/bright    Diagnostic Test Results:  Labs reviewed in Epic        Assessment & Plan     1. Need for prophylactic vaccination and inoculation against influenza  - INFLUENZA VACCINE IM > 6 MONTHS VALENT IIV4 [28711]  - Vaccine Administration, Initial [00579]    2. SERENA (generalized anxiety disorder)  Tolerating well.  Interested in dose increase.  Increase to 75mg, consider further increase to 100mg daily.  She will update me.  - sertraline (ZOLOFT) 25 MG tablet; Take 3 tablets (75 mg) by mouth daily  Dispense: 270 tablet; Refill: 0    3. Non-seasonal allergic rhinitis due to animal hair and dander  Stable, refilled.  - montelukast (SINGULAIR) 10 MG tablet; Take 1 tablet (10 mg) by mouth At Bedtime  Dispense: 90 tablet; Refill: 3    4. Screening for malignant neoplasm of cervix  - Pap imaged thin layer screen only - recommended age 21 - 24 years    5. Screen for STD (sexually transmitted disease)  - NEISSERIA GONORRHOEA PCR  - CHLAMYDIA TRACHOMATIS PCR     BMI:   Estimated body mass index is 30.85 kg/m  as calculated from the following:    Height as of 2/26/19: 1.588 m (5' 2.5\").    Weight as of this encounter: 77.7 kg (171 lb 6.4 oz).     Return in about 1 year (around 12/16/2020) for Physical Exam.    MAVERICK Partida Ra [facility]      "

## 2019-12-16 ENCOUNTER — OFFICE VISIT (OUTPATIENT)
Dept: FAMILY MEDICINE | Facility: CLINIC | Age: 24
End: 2019-12-16
Payer: COMMERCIAL

## 2019-12-16 VITALS
SYSTOLIC BLOOD PRESSURE: 126 MMHG | TEMPERATURE: 99.6 F | BODY MASS INDEX: 30.85 KG/M2 | WEIGHT: 171.4 LBS | RESPIRATION RATE: 16 BRPM | DIASTOLIC BLOOD PRESSURE: 76 MMHG | OXYGEN SATURATION: 99 % | HEART RATE: 108 BPM

## 2019-12-16 DIAGNOSIS — Z23 NEED FOR PROPHYLACTIC VACCINATION AND INOCULATION AGAINST INFLUENZA: Primary | ICD-10-CM

## 2019-12-16 DIAGNOSIS — J30.81 NON-SEASONAL ALLERGIC RHINITIS DUE TO ANIMAL HAIR AND DANDER: ICD-10-CM

## 2019-12-16 DIAGNOSIS — Z11.3 SCREEN FOR STD (SEXUALLY TRANSMITTED DISEASE): ICD-10-CM

## 2019-12-16 DIAGNOSIS — F41.1 GAD (GENERALIZED ANXIETY DISORDER): ICD-10-CM

## 2019-12-16 DIAGNOSIS — Z12.4 SCREENING FOR MALIGNANT NEOPLASM OF CERVIX: ICD-10-CM

## 2019-12-16 PROCEDURE — 87591 N.GONORRHOEAE DNA AMP PROB: CPT | Performed by: NURSE PRACTITIONER

## 2019-12-16 PROCEDURE — 99214 OFFICE O/P EST MOD 30 MIN: CPT | Mod: 25 | Performed by: NURSE PRACTITIONER

## 2019-12-16 PROCEDURE — 96127 BRIEF EMOTIONAL/BEHAV ASSMT: CPT | Performed by: NURSE PRACTITIONER

## 2019-12-16 PROCEDURE — 87491 CHLMYD TRACH DNA AMP PROBE: CPT | Performed by: NURSE PRACTITIONER

## 2019-12-16 PROCEDURE — 90471 IMMUNIZATION ADMIN: CPT | Performed by: NURSE PRACTITIONER

## 2019-12-16 PROCEDURE — G0145 SCR C/V CYTO,THINLAYER,RESCR: HCPCS | Performed by: NURSE PRACTITIONER

## 2019-12-16 PROCEDURE — 90686 IIV4 VACC NO PRSV 0.5 ML IM: CPT | Performed by: NURSE PRACTITIONER

## 2019-12-16 RX ORDER — MONTELUKAST SODIUM 10 MG/1
10 TABLET ORAL AT BEDTIME
Qty: 90 TABLET | Refills: 3 | Status: SHIPPED | OUTPATIENT
Start: 2019-12-16 | End: 2023-02-13

## 2019-12-16 RX ORDER — SERTRALINE HYDROCHLORIDE 25 MG/1
75 TABLET, FILM COATED ORAL DAILY
Qty: 270 TABLET | Refills: 0 | Status: SHIPPED | OUTPATIENT
Start: 2019-12-16 | End: 2020-05-08

## 2019-12-16 ASSESSMENT — ANXIETY QUESTIONNAIRES
6. BECOMING EASILY ANNOYED OR IRRITABLE: NOT AT ALL
5. BEING SO RESTLESS THAT IT IS HARD TO SIT STILL: SEVERAL DAYS
1. FEELING NERVOUS, ANXIOUS, OR ON EDGE: MORE THAN HALF THE DAYS
IF YOU CHECKED OFF ANY PROBLEMS ON THIS QUESTIONNAIRE, HOW DIFFICULT HAVE THESE PROBLEMS MADE IT FOR YOU TO DO YOUR WORK, TAKE CARE OF THINGS AT HOME, OR GET ALONG WITH OTHER PEOPLE: SOMEWHAT DIFFICULT
3. WORRYING TOO MUCH ABOUT DIFFERENT THINGS: MORE THAN HALF THE DAYS
GAD7 TOTAL SCORE: 8
7. FEELING AFRAID AS IF SOMETHING AWFUL MIGHT HAPPEN: SEVERAL DAYS
2. NOT BEING ABLE TO STOP OR CONTROL WORRYING: SEVERAL DAYS

## 2019-12-16 ASSESSMENT — PATIENT HEALTH QUESTIONNAIRE - PHQ9
5. POOR APPETITE OR OVEREATING: SEVERAL DAYS
SUM OF ALL RESPONSES TO PHQ QUESTIONS 1-9: 10

## 2019-12-16 NOTE — PATIENT INSTRUCTIONS
Increase your zoloft to 75mg daily.  Let me know if you'd like to increase to 100mg daily and I will send this in for you.

## 2019-12-17 ASSESSMENT — ANXIETY QUESTIONNAIRES: GAD7 TOTAL SCORE: 8

## 2019-12-18 LAB
COPATH REPORT: NORMAL
PAP: NORMAL

## 2019-12-20 ENCOUNTER — TRANSFERRED RECORDS (OUTPATIENT)
Dept: HEALTH INFORMATION MANAGEMENT | Facility: CLINIC | Age: 24
End: 2019-12-20

## 2020-07-08 ENCOUNTER — E-VISIT (OUTPATIENT)
Dept: FAMILY MEDICINE | Facility: CLINIC | Age: 25
End: 2020-07-08
Payer: COMMERCIAL

## 2020-07-08 DIAGNOSIS — F41.1 GAD (GENERALIZED ANXIETY DISORDER): Primary | ICD-10-CM

## 2020-07-08 PROCEDURE — 99421 OL DIG E/M SVC 5-10 MIN: CPT | Performed by: NURSE PRACTITIONER

## 2020-07-08 ASSESSMENT — ANXIETY QUESTIONNAIRES
5. BEING SO RESTLESS THAT IT IS HARD TO SIT STILL: NOT AT ALL
7. FEELING AFRAID AS IF SOMETHING AWFUL MIGHT HAPPEN: SEVERAL DAYS
7. FEELING AFRAID AS IF SOMETHING AWFUL MIGHT HAPPEN: SEVERAL DAYS
3. WORRYING TOO MUCH ABOUT DIFFERENT THINGS: SEVERAL DAYS
6. BECOMING EASILY ANNOYED OR IRRITABLE: SEVERAL DAYS
GAD7 TOTAL SCORE: 6
GAD7 TOTAL SCORE: 6
2. NOT BEING ABLE TO STOP OR CONTROL WORRYING: SEVERAL DAYS
4. TROUBLE RELAXING: SEVERAL DAYS
GAD7 TOTAL SCORE: 6
1. FEELING NERVOUS, ANXIOUS, OR ON EDGE: SEVERAL DAYS

## 2020-07-08 ASSESSMENT — PATIENT HEALTH QUESTIONNAIRE - PHQ9
SUM OF ALL RESPONSES TO PHQ QUESTIONS 1-9: 11
SUM OF ALL RESPONSES TO PHQ QUESTIONS 1-9: 11
10. IF YOU CHECKED OFF ANY PROBLEMS, HOW DIFFICULT HAVE THESE PROBLEMS MADE IT FOR YOU TO DO YOUR WORK, TAKE CARE OF THINGS AT HOME, OR GET ALONG WITH OTHER PEOPLE: SOMEWHAT DIFFICULT

## 2020-07-09 ASSESSMENT — ANXIETY QUESTIONNAIRES: GAD7 TOTAL SCORE: 6

## 2020-07-09 ASSESSMENT — PATIENT HEALTH QUESTIONNAIRE - PHQ9: SUM OF ALL RESPONSES TO PHQ QUESTIONS 1-9: 11

## 2020-07-10 RX ORDER — SERTRALINE HYDROCHLORIDE 100 MG/1
100 TABLET, FILM COATED ORAL DAILY
Qty: 90 TABLET | Refills: 0 | Status: SHIPPED | OUTPATIENT
Start: 2020-07-10 | End: 2021-01-04

## 2020-07-15 ENCOUNTER — OFFICE VISIT (OUTPATIENT)
Dept: FAMILY MEDICINE | Facility: CLINIC | Age: 25
End: 2020-07-15
Payer: COMMERCIAL

## 2020-07-15 VITALS
SYSTOLIC BLOOD PRESSURE: 116 MMHG | HEIGHT: 62 IN | TEMPERATURE: 98.8 F | RESPIRATION RATE: 14 BRPM | OXYGEN SATURATION: 100 % | WEIGHT: 174 LBS | HEART RATE: 92 BPM | DIASTOLIC BLOOD PRESSURE: 80 MMHG | BODY MASS INDEX: 32.02 KG/M2

## 2020-07-15 DIAGNOSIS — R14.0 BLOATING: ICD-10-CM

## 2020-07-15 DIAGNOSIS — R19.7 DIARRHEA, UNSPECIFIED TYPE: Primary | ICD-10-CM

## 2020-07-15 LAB
BASOPHILS # BLD AUTO: 0 10E9/L (ref 0–0.2)
BASOPHILS NFR BLD AUTO: 0.3 %
DIFFERENTIAL METHOD BLD: NORMAL
EOSINOPHIL # BLD AUTO: 0.1 10E9/L (ref 0–0.7)
EOSINOPHIL NFR BLD AUTO: 1.1 %
ERYTHROCYTE [DISTWIDTH] IN BLOOD BY AUTOMATED COUNT: 12.2 % (ref 10–15)
HCT VFR BLD AUTO: 40.4 % (ref 35–47)
HGB BLD-MCNC: 13.2 G/DL (ref 11.7–15.7)
LYMPHOCYTES # BLD AUTO: 1.6 10E9/L (ref 0.8–5.3)
LYMPHOCYTES NFR BLD AUTO: 24.4 %
MCH RBC QN AUTO: 27.8 PG (ref 26.5–33)
MCHC RBC AUTO-ENTMCNC: 32.7 G/DL (ref 31.5–36.5)
MCV RBC AUTO: 85 FL (ref 78–100)
MONOCYTES # BLD AUTO: 0.4 10E9/L (ref 0–1.3)
MONOCYTES NFR BLD AUTO: 5.7 %
NEUTROPHILS # BLD AUTO: 4.5 10E9/L (ref 1.6–8.3)
NEUTROPHILS NFR BLD AUTO: 68.5 %
PLATELET # BLD AUTO: 336 10E9/L (ref 150–450)
RBC # BLD AUTO: 4.74 10E12/L (ref 3.8–5.2)
WBC # BLD AUTO: 6.6 10E9/L (ref 4–11)

## 2020-07-15 PROCEDURE — 36415 COLL VENOUS BLD VENIPUNCTURE: CPT | Performed by: NURSE PRACTITIONER

## 2020-07-15 PROCEDURE — 85025 COMPLETE CBC W/AUTO DIFF WBC: CPT | Performed by: NURSE PRACTITIONER

## 2020-07-15 PROCEDURE — 99214 OFFICE O/P EST MOD 30 MIN: CPT | Performed by: NURSE PRACTITIONER

## 2020-07-15 PROCEDURE — 82784 ASSAY IGA/IGD/IGG/IGM EACH: CPT | Performed by: NURSE PRACTITIONER

## 2020-07-15 PROCEDURE — 83516 IMMUNOASSAY NONANTIBODY: CPT | Performed by: NURSE PRACTITIONER

## 2020-07-15 PROCEDURE — 80053 COMPREHEN METABOLIC PANEL: CPT | Performed by: NURSE PRACTITIONER

## 2020-07-15 ASSESSMENT — MIFFLIN-ST. JEOR: SCORE: 1496.48

## 2020-07-15 ASSESSMENT — ENCOUNTER SYMPTOMS
NERVOUS/ANXIOUS: 1
BREAST MASS: 0
DIARRHEA: 1

## 2020-07-15 NOTE — PROGRESS NOTES
"Shiraz العلي is a 25 year old female who presents to clinic today for the following health issues:    HPI       Pt here with concerns regarding diarrhea and bloating.  Present since returning home from school in April.  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  Occasional diarrhea while there which she attributes to water quality or food.  She denies any fever or weight change.  She has not been vomiting.  She does have a hx of ulcers, was treated with PPI and abx.  No black or bloody stools.  She has tried pepto bismol and tums with no change.  Omeprazole does help slightly.  Stools are typically soft and loose, but not completely watery.    Patient Active Problem List   Diagnosis     SERENA (generalized anxiety disorder)     Allergic rhinitis due to animals     Obesity (BMI 30-39.9)     History reviewed. No pertinent surgical history.    Social History     Tobacco Use     Smoking status: Never Smoker     Smokeless tobacco: Never Used   Substance Use Topics     Alcohol use: No     Family History   Problem Relation Age of Onset     Stomach Cancer Maternal Grandmother            Reviewed and updated as needed this visit by Provider         Review of Systems   Constitutional, HEENT, cardiovascular, pulmonary, gi and gu systems are negative, except as otherwise noted.      Objective    /80 (BP Location: Right arm, Patient Position: Chair, Cuff Size: Adult Large)   Pulse 92   Temp 98.8  F (37.1  C) (Oral)   Resp 14   Ht 1.581 m (5' 2.25\")   Wt 78.9 kg (174 lb)   LMP 06/24/2020 (Approximate)   SpO2 100%   BMI 31.57 kg/m    Body mass index is 31.57 kg/m .  Physical Exam   GENERAL: healthy, alert and no distress  NECK: no adenopathy, no asymmetry, masses, or scars and thyroid normal to palpation  RESP: lungs clear to auscultation - no rales, rhonchi or wheezes  CV: regular rate and rhythm, normal S1 S2, no S3 or S4, no murmur, click or rub, no peripheral edema and peripheral pulses " "strong  ABDOMEN: soft, nontender, no hepatosplenomegaly, no masses and bowel sounds normal  PSYCH: mentation appears normal, affect normal/bright    Diagnostic Test Results:  none         Assessment & Plan     1. Diarrhea, unspecified type  Discussed options.  Will start with labs.  Consider GI if needed.  Pt agrees with plan and verbalized understanding.  - CBC with platelets and differential  - Comprehensive metabolic panel (BMP + Alb, Alk Phos, ALT, AST, Total. Bili, TP)  - Helicobacter pylori Antigen Stool; Future  - Cryptosporidium/Giardia Immunoassay; Future  - Ova and Parasite Exam Routine; Future  - Enteric Bacteria and Virus Panel by PERRY Stool; Future  - Tissue transglutaminase aracely IgA and IgG  - IgA    2. Bloating  - CBC with platelets and differential  - Comprehensive metabolic panel (BMP + Alb, Alk Phos, ALT, AST, Total. Bili, TP)  - Helicobacter pylori Antigen Stool; Future  - Cryptosporidium/Giardia Immunoassay; Future  - Ova and Parasite Exam Routine; Future  - Enteric Bacteria and Virus Panel by PERRY Stool; Future  - Tissue transglutaminase aracely IgA and IgG  - IgA     BMI:   Estimated body mass index is 31.57 kg/m  as calculated from the following:    Height as of this encounter: 1.581 m (5' 2.25\").    Weight as of this encounter: 78.9 kg (174 lb).     No follow-ups on file.    MAVERICK Partida Ra [facility] [place]  "

## 2020-07-16 LAB — IGA SERPL-MCNC: 81 MG/DL (ref 84–499)

## 2020-07-17 DIAGNOSIS — R14.0 BLOATING: ICD-10-CM

## 2020-07-17 DIAGNOSIS — R19.7 DIARRHEA, UNSPECIFIED TYPE: ICD-10-CM

## 2020-07-17 LAB
ALBUMIN SERPL-MCNC: 4 G/DL (ref 3.4–5)
ALP SERPL-CCNC: 85 U/L (ref 40–150)
ALT SERPL W P-5'-P-CCNC: 16 U/L (ref 0–50)
ANION GAP SERPL CALCULATED.3IONS-SCNC: 6 MMOL/L (ref 3–14)
AST SERPL W P-5'-P-CCNC: 11 U/L (ref 0–45)
BILIRUB SERPL-MCNC: 0.4 MG/DL (ref 0.2–1.3)
BUN SERPL-MCNC: 8 MG/DL (ref 7–30)
C COLI+JEJUNI+LARI FUSA STL QL NAA+PROBE: NOT DETECTED
CALCIUM SERPL-MCNC: 9.1 MG/DL (ref 8.5–10.1)
CHLORIDE SERPL-SCNC: 108 MMOL/L (ref 94–109)
CO2 SERPL-SCNC: 24 MMOL/L (ref 20–32)
CREAT SERPL-MCNC: 0.58 MG/DL (ref 0.52–1.04)
EC STX1 GENE STL QL NAA+PROBE: NOT DETECTED
EC STX2 GENE STL QL NAA+PROBE: NOT DETECTED
ENTERIC PATHOGEN COMMENT: NORMAL
GFR SERPL CREATININE-BSD FRML MDRD: >90 ML/MIN/{1.73_M2}
GLUCOSE SERPL-MCNC: 91 MG/DL (ref 70–99)
NOROV GI+II ORF1-ORF2 JNC STL QL NAA+PR: NOT DETECTED
POTASSIUM SERPL-SCNC: 4.1 MMOL/L (ref 3.4–5.3)
PROT SERPL-MCNC: 7.4 G/DL (ref 6.8–8.8)
RVA NSP5 STL QL NAA+PROBE: NOT DETECTED
SALMONELLA SP RPOD STL QL NAA+PROBE: NOT DETECTED
SHIGELLA SP+EIEC IPAH STL QL NAA+PROBE: NOT DETECTED
SODIUM SERPL-SCNC: 138 MMOL/L (ref 133–144)
TTG IGA SER-ACNC: <1 U/ML
TTG IGG SER-ACNC: 1 U/ML
V CHOL+PARA RFBL+TRKH+TNAA STL QL NAA+PR: NOT DETECTED
Y ENTERO RECN STL QL NAA+PROBE: NOT DETECTED

## 2020-07-17 PROCEDURE — 87177 OVA AND PARASITES SMEARS: CPT | Performed by: NURSE PRACTITIONER

## 2020-07-17 PROCEDURE — 87338 HPYLORI STOOL AG IA: CPT | Performed by: NURSE PRACTITIONER

## 2020-07-17 PROCEDURE — 87329 GIARDIA AG IA: CPT | Mod: 59 | Performed by: NURSE PRACTITIONER

## 2020-07-17 PROCEDURE — 87506 IADNA-DNA/RNA PROBE TQ 6-11: CPT | Performed by: NURSE PRACTITIONER

## 2020-07-17 PROCEDURE — 36415 COLL VENOUS BLD VENIPUNCTURE: CPT | Performed by: NURSE PRACTITIONER

## 2020-07-17 PROCEDURE — 87209 SMEAR COMPLEX STAIN: CPT | Performed by: NURSE PRACTITIONER

## 2020-07-17 PROCEDURE — 87328 CRYPTOSPORIDIUM AG IA: CPT | Performed by: NURSE PRACTITIONER

## 2020-07-20 LAB
C PARVUM AG STL QL IA: NEGATIVE
G LAMBLIA AG STL QL IA: NEGATIVE
H PYLORI AG STL QL IA: NEGATIVE
O+P STL MICRO: NORMAL
O+P STL MICRO: NORMAL
SPECIMEN SOURCE: NORMAL
SPECIMEN SOURCE: NORMAL

## 2020-07-31 DIAGNOSIS — R19.7 DIARRHEA, UNSPECIFIED TYPE: Primary | ICD-10-CM

## 2020-12-30 DIAGNOSIS — F41.1 GAD (GENERALIZED ANXIETY DISORDER): ICD-10-CM

## 2020-12-30 NOTE — LETTER
January 19, 2021      Clayton العلي  [address] [place] [place] [address]        Dear Ms. Clayton HOLLINS Severiano,    We recently received a call from your pharmacy requesting a refill of your medication Sertraline.    We are contacting you today to notify you that you are due for a medication check for further refills.    We have authorized one refill of your medication to allow time for you to schedule your appointment.    This appointment can be in clinic or virtual by either telephone, video.    Please call [phone] to schedule an appointment or if you have MyChart you can schedule with your provider as well.    Taking care of your health is important to us, an ongoing visits with your provider are vital to your care. We look forward to seeing you in the near future.    Thank you for using [facility] for your Medical Needs.          Sincerely,     Jaymie Vela, RICARDA, FNP-BC

## 2021-01-04 RX ORDER — SERTRALINE HYDROCHLORIDE 100 MG/1
TABLET, FILM COATED ORAL
Qty: 90 TABLET | Refills: 0 | Status: SHIPPED | OUTPATIENT
Start: 2021-01-04 | End: 2021-04-26

## 2021-01-04 NOTE — TELEPHONE ENCOUNTER
Medication is being filled for 1 time refill only due to:  Patient needs to be seen because due for a px. - this is for sertraline    Will forward to the station, please try to help the pt schedule an appt for a physical.  Thanks!

## 2021-01-15 ENCOUNTER — HEALTH MAINTENANCE LETTER (OUTPATIENT)
Age: 26
End: 2021-01-15

## 2021-03-05 ENCOUNTER — VIRTUAL VISIT (OUTPATIENT)
Dept: FAMILY MEDICINE | Facility: CLINIC | Age: 26
End: 2021-03-05
Payer: COMMERCIAL

## 2021-03-05 DIAGNOSIS — F41.1 GAD (GENERALIZED ANXIETY DISORDER): Primary | ICD-10-CM

## 2021-03-05 PROCEDURE — 99213 OFFICE O/P EST LOW 20 MIN: CPT | Mod: 95 | Performed by: NURSE PRACTITIONER

## 2021-03-05 RX ORDER — SERTRALINE HYDROCHLORIDE 25 MG/1
25 TABLET, FILM COATED ORAL DAILY
Qty: 90 TABLET | Refills: 1 | Status: SHIPPED | OUTPATIENT
Start: 2021-03-05 | End: 2023-02-13

## 2021-03-05 RX ORDER — SERTRALINE HYDROCHLORIDE 100 MG/1
100 TABLET, FILM COATED ORAL DAILY
Qty: 90 TABLET | Refills: 1 | Status: SHIPPED | OUTPATIENT
Start: 2021-03-05 | End: 2021-04-26

## 2021-03-05 NOTE — PROGRESS NOTES
"Clayton is a 25 year old who is being evaluated via a billable video visit.      How would you like to obtain your AVS? MyChart  If the video visit is dropped, the invitation should be resent by: MY CHART  Will anyone else be joining your video visit? No    Video Start Time: 11:49 AM    Assessment & Plan     SERENA (generalized anxiety disorder)  Increase dose to 125mg daily.  Could consider adding propranolol as well if needed.  She will update me.  - sertraline (ZOLOFT) 100 MG tablet; Take 1 tablet (100 mg) by mouth daily  - sertraline (ZOLOFT) 25 MG tablet; Take 1 tablet (25 mg) by mouth daily In addition to the 100mg tablet             BMI:   Estimated body mass index is 31.57 kg/m  as calculated from the following:    Height as of 7/15/20: 1.581 m (5' 2.25\").    Weight as of 7/15/20: 78.9 kg (174 lb).           No follow-ups on file.    MAVERICK Partida Ra CNP  [facility]    Shiraz Arnold is a 25 year old who presents for the following health issues     HPI       Anxiety Follow-Up    How are you doing with your anxiety since your last visit? No change    Are you having other symptoms that might be associated with anxiety? Yes:  Hand Shaking     Have you had a significant life event? No     Are you feeling depressed? No    Do you have any concerns with your use of alcohol or other drugs? No    Social History     Tobacco Use     Smoking status: Never Smoker     Smokeless tobacco: Never Used   Substance Use Topics     Alcohol use: No     Drug use: No     SERENA-7 SCORE 10/23/2018 12/16/2019 7/8/2020   Total Score - - 6 (mild anxiety)   Total Score 12 8 6     PHQ 10/23/2018 12/16/2019 7/8/2020   PHQ-9 Total Score 8 10 11   Q9: Thoughts of better off dead/self-harm past 2 weeks Not at all Not at all Not at all           How many servings of fruits and vegetables do you eat daily?  2-3    On average, how many sweetened beverages do you drink each day (Examples: soda, juice, sweet tea, etc.  " Do NOT count diet or artificially sweetened beverages)?   1    How many days per week do you exercise enough to make your heart beat faster? 6    How many minutes a day do you exercise enough to make your heart beat faster? 30 - 60  How many days per week do you miss taking your medication? 1    What makes it hard for you to take your medications?  remembering to take    Increased anxiety with return to school.  Experiencing hand tremors due to her anxiety.  Interested in titrating sertraline dose.    Review of Systems   Constitutional, HEENT, cardiovascular, pulmonary, gi and gu systems are negative, except as otherwise noted.      Objective           Vitals:  No vitals were obtained today due to virtual visit.    Physical Exam   GENERAL: Healthy, alert and no distress  EYES: Eyes grossly normal to inspection.  No discharge or erythema, or obvious scleral/conjunctival abnormalities.  RESP: No audible wheeze, cough, or visible cyanosis.  No visible retractions or increased work of breathing.    SKIN: Visible skin clear. No significant rash, abnormal pigmentation or lesions.  NEURO: Cranial nerves grossly intact.  Mentation and speech appropriate for age.  PSYCH: Mentation appears normal, affect normal/bright, judgement and insight intact, normal speech and appearance well-groomed.                Video-Visit Details    Type of service:  Video Visit    Video End Time:11:55 AM    Originating Location (pt. Location): Home    Distant Location (provider location):  [facility] [employer]     Platform used for Video Visit: Jeniffer

## 2021-04-26 ENCOUNTER — OFFICE VISIT (OUTPATIENT)
Dept: FAMILY MEDICINE | Facility: CLINIC | Age: 26
End: 2021-04-26
Payer: COMMERCIAL

## 2021-04-26 VITALS
HEIGHT: 63 IN | RESPIRATION RATE: 20 BRPM | BODY MASS INDEX: 28.7 KG/M2 | WEIGHT: 162 LBS | TEMPERATURE: 98.3 F | HEART RATE: 100 BPM | SYSTOLIC BLOOD PRESSURE: 110 MMHG | DIASTOLIC BLOOD PRESSURE: 60 MMHG

## 2021-04-26 DIAGNOSIS — N92.1 BREAKTHROUGH BLEEDING ON NEXPLANON: ICD-10-CM

## 2021-04-26 DIAGNOSIS — F41.1 GAD (GENERALIZED ANXIETY DISORDER): Primary | ICD-10-CM

## 2021-04-26 DIAGNOSIS — Z97.5 BREAKTHROUGH BLEEDING ON NEXPLANON: ICD-10-CM

## 2021-04-26 LAB — HGB BLD-MCNC: 13.6 G/DL (ref 11.7–15.7)

## 2021-04-26 PROCEDURE — 99214 OFFICE O/P EST MOD 30 MIN: CPT | Performed by: PHYSICIAN ASSISTANT

## 2021-04-26 PROCEDURE — 85018 HEMOGLOBIN: CPT | Performed by: PHYSICIAN ASSISTANT

## 2021-04-26 PROCEDURE — 36415 COLL VENOUS BLD VENIPUNCTURE: CPT | Performed by: PHYSICIAN ASSISTANT

## 2021-04-26 RX ORDER — NORETHINDRONE ACETATE AND ETHINYL ESTRADIOL .03; 1.5 MG/1; MG/1
1 TABLET ORAL DAILY
Qty: 84 TABLET | Refills: 0 | Status: SHIPPED | OUTPATIENT
Start: 2021-04-26 | End: 2023-02-13

## 2021-04-26 RX ORDER — SERTRALINE HYDROCHLORIDE 100 MG/1
150 TABLET, FILM COATED ORAL DAILY
Qty: 135 TABLET | Refills: 0 | Status: SHIPPED | OUTPATIENT
Start: 2021-04-26 | End: 2023-02-13

## 2021-04-26 ASSESSMENT — MIFFLIN-ST. JEOR: SCORE: 1444.99

## 2021-04-26 NOTE — PROGRESS NOTES
"    Assessment & Plan     SERENA (generalized anxiety disorder)  Discussed increasing zoloft to 150mg for a while.  I refilled the 100mg Rx for her.  We should repeat the PHQ and SERENA in a month or so.  [place]. She could eventually go back to 125mg of zoloft when the stress settles down.  - sertraline (ZOLOFT) 100 MG tablet; Take 1.5 tablets (150 mg) by mouth daily      Breakthrough bleeding on Nexplanon  Discussed.  This could be a result of above stress but due to length of bleeding- one month- will have her try a month of OCP's.  Discussed also trying 600mg ibuprofen TID for 7-10 days.  Dispensed 3 mo of OCP's so she can repeat if needed.  - norethindrone-ethinyl estradiol (MICROGESTIN 1.5/30) 1.5-30 MG-MCG tablet; Take 1 tablet by mouth daily  - Hemoglobin       BMI:   Estimated body mass index is 28.93 kg/m  as calculated from the following:    Height as of this encounter: 1.594 m (5' 2.75\").    Weight as of this encounter: 73.5 kg (162 lb).   Weight management plan: Discussed healthy diet and exercise guidelines        Return in about 1 month (around 5/26/2021) for if symptoms worsen or fail to improve.    KATYA [facility] DOV Arnold is a 26 year old who presents for the following health issues    HPI     Menstrual Concern  Onset/Duration: a month  Description:   Duration of bleeding episodes: everyday  Frequency of periods: (1st day of one to 1st day of next):  every 2-3 months without a menstrual period  Describe bleeding/flow:   Clots: YES  Number of pads/day: every couple hours        Cramping: moderate, mild and during  Accompanying Signs & Symptoms:  Lightheadedness: YES  Temperature intolerance: YES  Nosebleeds/Easy bruising: no  Vaginal Discharge: no  Acne: YES- normal  Change in body hair: no  History:  Patient's last menstrual period was 03/22/2021 (exact date).  Previous normal periods: " "YES  Contraceptive use: Nexplanon  Sexually active: no  Any bleeding after intercourse: not applicable  Abnormal PAP Smears: no  Precipitating or alleviating factors: under a lot stress currently, grandma passed away  Therapies tried and outcome: None    Nexplanon placed 4/2019.  Had some irregular but expected spotting early and rarely since.  Now in the last month has had clotting, cramping, dark bleeding requiring a pad several times daily     Anxiety- worse in last couple weeks.  Grandmother passed away recently.  Is in vet school and taking exams.  She is taking 125mg zoloft.    Review of Systems   Constitutional, HEENT, cardiovascular, pulmonary, gi and gu systems are negative, except as otherwise noted.      Objective    /60 (BP Location: Right arm, Patient Position: Sitting, Cuff Size: Adult Regular)   Pulse 100   Temp 98.3  F (36.8  C) (Oral)   Resp 20   Ht 1.594 m (5' 2.75\")   Wt 73.5 kg (162 lb)   LMP 03/22/2021 (Exact Date)   BMI 28.93 kg/m    Body mass index is 28.93 kg/m .  Physical Exam   GENERAL: healthy, alert and no distress  MS: no gross musculoskeletal defects noted, no edema  SKIN: no suspicious lesions or rashes  PSYCH: mentation appears normal, affect normal/bright    Results for orders placed or performed in visit on 04/26/21 (from the past 24 hour(s))   Hemoglobin   Result Value Ref Range    Hemoglobin 13.6 11.7 - 15.7 g/dL               "

## 2021-04-28 ASSESSMENT — ANXIETY QUESTIONNAIRES
1. FEELING NERVOUS, ANXIOUS, OR ON EDGE: MORE THAN HALF THE DAYS
IF YOU CHECKED OFF ANY PROBLEMS ON THIS QUESTIONNAIRE, HOW DIFFICULT HAVE THESE PROBLEMS MADE IT FOR YOU TO DO YOUR WORK, TAKE CARE OF THINGS AT HOME, OR GET ALONG WITH OTHER PEOPLE: SOMEWHAT DIFFICULT
7. FEELING AFRAID AS IF SOMETHING AWFUL MIGHT HAPPEN: SEVERAL DAYS
5. BEING SO RESTLESS THAT IT IS HARD TO SIT STILL: NOT AT ALL
3. WORRYING TOO MUCH ABOUT DIFFERENT THINGS: SEVERAL DAYS
2. NOT BEING ABLE TO STOP OR CONTROL WORRYING: SEVERAL DAYS
6. BECOMING EASILY ANNOYED OR IRRITABLE: SEVERAL DAYS
GAD7 TOTAL SCORE: 6

## 2021-04-28 ASSESSMENT — PATIENT HEALTH QUESTIONNAIRE - PHQ9
5. POOR APPETITE OR OVEREATING: NOT AT ALL
SUM OF ALL RESPONSES TO PHQ QUESTIONS 1-9: 8

## 2021-04-29 ASSESSMENT — ANXIETY QUESTIONNAIRES: GAD7 TOTAL SCORE: 6

## 2021-08-13 ENCOUNTER — HOSPITAL ENCOUNTER (EMERGENCY)
Facility: CLINIC | Age: 26
Discharge: HOME | End: 2021-08-13
Attending: EMERGENCY MEDICINE | Admitting: EMERGENCY MEDICINE
Payer: COMMERCIAL

## 2021-08-13 VITALS
DIASTOLIC BLOOD PRESSURE: 74 MMHG | RESPIRATION RATE: 20 BRPM | OXYGEN SATURATION: 99 % | SYSTOLIC BLOOD PRESSURE: 122 MMHG | TEMPERATURE: 98.4 F | HEART RATE: 106 BPM

## 2021-08-13 DIAGNOSIS — R11.10 VOMITING AND DIARRHEA: ICD-10-CM

## 2021-08-13 DIAGNOSIS — R19.7 VOMITING AND DIARRHEA: ICD-10-CM

## 2021-08-13 LAB
ANION GAP SERPL CALCULATED.3IONS-SCNC: 7 MMOL/L (ref 3–14)
BASOPHILS # BLD AUTO: 0 10E3/UL (ref 0–0.2)
BASOPHILS NFR BLD AUTO: 0 %
BUN SERPL-MCNC: 14 MG/DL (ref 7–30)
CALCIUM SERPL-MCNC: 9.6 MG/DL (ref 8.5–10.1)
CHLORIDE BLD-SCNC: 111 MMOL/L (ref 94–109)
CO2 SERPL-SCNC: 22 MMOL/L (ref 20–32)
CREAT SERPL-MCNC: 0.66 MG/DL (ref 0.52–1.04)
EOSINOPHIL # BLD AUTO: 0.1 10E3/UL (ref 0–0.7)
EOSINOPHIL NFR BLD AUTO: 1 %
ERYTHROCYTE [DISTWIDTH] IN BLOOD BY AUTOMATED COUNT: 13.2 % (ref 10–15)
GFR SERPL CREATININE-BSD FRML MDRD: >90 ML/MIN/1.73M2
GLUCOSE BLD-MCNC: 142 MG/DL (ref 70–99)
HCT VFR BLD AUTO: 46.7 % (ref 35–47)
HGB BLD-MCNC: 15.5 G/DL (ref 11.7–15.7)
HOLD SPECIMEN: NORMAL
IMM GRANULOCYTES # BLD: 0.1 10E3/UL
IMM GRANULOCYTES NFR BLD: 1 %
LYMPHOCYTES # BLD AUTO: 0.9 10E3/UL (ref 0.8–5.3)
LYMPHOCYTES NFR BLD AUTO: 5 %
MCH RBC QN AUTO: 28.8 PG (ref 26.5–33)
MCHC RBC AUTO-ENTMCNC: 33.2 G/DL (ref 31.5–36.5)
MCV RBC AUTO: 87 FL (ref 78–100)
MONOCYTES # BLD AUTO: 0.7 10E3/UL (ref 0–1.3)
MONOCYTES NFR BLD AUTO: 4 %
NEUTROPHILS # BLD AUTO: 15 10E3/UL (ref 1.6–8.3)
NEUTROPHILS NFR BLD AUTO: 89 %
NRBC # BLD AUTO: 0 10E3/UL
NRBC BLD AUTO-RTO: 0 /100
PLATELET # BLD AUTO: 407 10E3/UL (ref 150–450)
POTASSIUM BLD-SCNC: 3.9 MMOL/L (ref 3.4–5.3)
RBC # BLD AUTO: 5.38 10E6/UL (ref 3.8–5.2)
SODIUM SERPL-SCNC: 140 MMOL/L (ref 133–144)
WBC # BLD AUTO: 16.9 10E3/UL (ref 4–11)

## 2021-08-13 PROCEDURE — 96361 HYDRATE IV INFUSION ADD-ON: CPT

## 2021-08-13 PROCEDURE — 80048 BASIC METABOLIC PNL TOTAL CA: CPT | Performed by: EMERGENCY MEDICINE

## 2021-08-13 PROCEDURE — 36415 COLL VENOUS BLD VENIPUNCTURE: CPT | Performed by: EMERGENCY MEDICINE

## 2021-08-13 PROCEDURE — 258N000003 HC RX IP 258 OP 636: Performed by: EMERGENCY MEDICINE

## 2021-08-13 PROCEDURE — 96375 TX/PRO/DX INJ NEW DRUG ADDON: CPT

## 2021-08-13 PROCEDURE — 96374 THER/PROPH/DIAG INJ IV PUSH: CPT

## 2021-08-13 PROCEDURE — 85025 COMPLETE CBC W/AUTO DIFF WBC: CPT | Performed by: EMERGENCY MEDICINE

## 2021-08-13 PROCEDURE — 250N000011 HC RX IP 250 OP 636: Performed by: EMERGENCY MEDICINE

## 2021-08-13 PROCEDURE — 99284 EMERGENCY DEPT VISIT MOD MDM: CPT | Mod: 25

## 2021-08-13 RX ORDER — METOCLOPRAMIDE HYDROCHLORIDE 5 MG/ML
10 INJECTION INTRAMUSCULAR; INTRAVENOUS ONCE
Status: COMPLETED | OUTPATIENT
Start: 2021-08-13 | End: 2021-08-13

## 2021-08-13 RX ORDER — DIPHENHYDRAMINE HYDROCHLORIDE 50 MG/ML
25 INJECTION INTRAMUSCULAR; INTRAVENOUS ONCE
Status: COMPLETED | OUTPATIENT
Start: 2021-08-13 | End: 2021-08-13

## 2021-08-13 RX ORDER — ONDANSETRON 4 MG/1
4 TABLET, ORALLY DISINTEGRATING ORAL EVERY 8 HOURS PRN
Qty: 10 TABLET | Refills: 0 | Status: SHIPPED | OUTPATIENT
Start: 2021-08-13 | End: 2021-08-16

## 2021-08-13 RX ADMIN — SODIUM CHLORIDE 1000 ML: 9 INJECTION, SOLUTION INTRAVENOUS at 03:38

## 2021-08-13 RX ADMIN — DIPHENHYDRAMINE HYDROCHLORIDE 25 MG: 50 INJECTION, SOLUTION INTRAMUSCULAR; INTRAVENOUS at 03:38

## 2021-08-13 RX ADMIN — METOCLOPRAMIDE HYDROCHLORIDE 10 MG: 5 INJECTION INTRAMUSCULAR; INTRAVENOUS at 03:38

## 2021-08-13 ASSESSMENT — ENCOUNTER SYMPTOMS
ABDOMINAL PAIN: 1
VOMITING: 1
NAUSEA: 1
DIARRHEA: 1

## 2021-08-13 NOTE — ED PROVIDER NOTES
History   Chief Complaint:  Nausea, Vomiting, & Diarrhea     HPI   Clayton العلي is a 26 year old female who presents with nausea, vomiting, and diarrhea. Patient states she was feeling unwell all day, but developed a bloating sensation and began to belch profusely at 2300. She then began to have multiple episodes of vomiting and diarrhea. She complains of associated upper abdominal pain which radiates inferiorly. No one else is having symptoms at home. She denies any recent ingestion of abnormal foods or fluids. She denies any known allergies to medications. She had a stomach ulcer six years ago which presented with similar symptoms.     Review of Systems   Gastrointestinal: Positive for abdominal pain, diarrhea, nausea and vomiting.   All other systems reviewed and are negative.        Allergies:  Animal Dander    Medications:  Singulair  Zoloft     Past Medical History:    Depressive Disorder  SERENA  Allergic Rhinitis   Obesity     Past Surgical History:    ENT Surgery     Family History:    Father - Hypertension, Substance Abuse   Mother - Anxiety Disorder  Sister - Depression, Anxiety Disorder     Social History:  Arrives to the emergency department unaccompanied.     Physical Exam     Patient Vitals for the past 24 hrs:   BP Temp Temp src Pulse Resp SpO2   08/13/21 0315 122/74 98.4  F (36.9  C) Oral 106 20 99 %       Physical Exam  Constitutional: Alert, attentive  HENT:    Nose: Nose normal.    Mouth/Throat: Oropharynx is clear, mucous membranes are moist  Eyes:  EOM are normal.    CV:  regular rate and rhythm; no murmurs, rubs or gallups  Chest: Effort normal and breath sounds normal.   GI:   Epigastrium - no tenderness, no guarding   RUQ - no tenderness or Villa's sign   RLQ - No tenderness, no guarding, no Rovsing's sign   Suprapubic area - no tenderness, no guarding    LLQ - no tenderness, no guarding   LUQ - no tenderness, no guarding   No distension. Normal bowel sounds  MSK: Normal range of motion.    Neurological: Alert, attentive  Skin: Skin is warm and dry.      Emergency Department Course     Laboratory:  CBC: WBC 16.9 (H), HGB 15.5,    BMP: Glucose 142 (H), Chloride 111 (H), o/w WNL (Creatinine: 0.66)    Emergency Department Course:    Reviewed:  I reviewed nursing notes, vitals and past medical history    Assessments:  0330 I obtained history and examined the patient as noted above.   0426 I rechecked the patient and explained findings. She feels improved.   0507 I rechecked the patient.    Interventions:  0338 0.9% NaCl bolus 1,000 mL IV  0338 Reglan 10 mg IV   0338 Benadryl 25 mg IV    Disposition:  The patient was discharged to home.     Impression & Plan     Medical Decision Making:  Clayton العلي is a 26 year old female who presents with acute nausea, vomiting and diarrhea. She has a benign abdominal exam without focal RLQ or LLQ tenderness to suggest diverticulitis or appendicitis, respectively, or other acute abdominal process. I did discuss the sometimes vague initial constellation of symptoms early in the course of acute abdominal processes, and the need for immediate return should pain or other concerning symptoms develop.  Symptoms are improved after IV fluids and symptomatic treatment.   There has been no travel or antibiotic exposure to suggest more concerning cause of diarrhea, and there has been no hematemesis or BRBPR/melena.  I believe she is safe for discharge in improved condition at this time with strict return precautions for recurrent vomiting, pain, fever or any other concerning symptoms.      Diagnosis:    ICD-10-CM    1. Vomiting and diarrhea  R11.10     R19.7        Discharge Medications:  New Prescriptions    ONDANSETRON (ZOFRAN ODT) 4 MG ODT TAB    Take 1 tablet (4 mg) by mouth every 8 hours as needed for nausea       Scribe Disclosure:  Greyson SINGH, am serving as a scribe at 3:24 AM on 8/13/2021 to document services personally performed by Curt Rust  MD Anil based on my observations and the provider's statements to me.            Curt Rust MD  08/13/21 [id number]

## 2021-08-13 NOTE — DISCHARGE INSTRUCTIONS
Discharge Instructions  Vomiting and Diarrhea    You have been seen today for vomiting and diarrhea. This is usually caused by a virus, but some bacteria, parasites, medicines or other medical conditions can cause similar symptoms. At this time your doctor does not find that your vomiting and diarrhea is a sign of anything dangerous or life-threatening.  However, sometimes the signs of serious illness do not show up right away.  If you have new or worse symptoms, you may need to be seen again in the emergency department or by your primary doctor. Remember that serious problems like appendicitis can look like gastroenteritis at first.       Return to the Emergency Department if:  You keep throwing up and you are not able to keep liquids down.  You feel you are getting dehydrated, such as being very thirsty, not urinating at least every 8-12 hours, or feeling faint or lightheaded.   You develop a new fever, or your fever continues for more than 2 days.  You have belly pain that seems worse than cramps, is in one spot, or is getting worse over time.   You have blood in your vomit or in your diarrhea.  You feel very weak   You are not starting to improve within 24 hours of your visit here    What can I do to help myself?  The most important thing to do is to drink clear liquids.  If you have been vomiting a lot, it is best to have only small, frequent sips of liquids.  Drinking too much at once may cause more vomiting. If you are vomiting often, you must replace minerals, sodium and potassium lost with your illness. Pedialyte  and sports drinks can help you replace these minerals.  You can also drink clear liquids such as water, weak tea, apple juice, and 7-up. Avoid acid liquids (orange), caffeine (coffee) or alcohol. Do not drink milk until you no longer have diarrhea.  After liquids are staying down, you may start eating mild foods. Soda crackers, toast, plain noodles, gelatin, applesauce and bananas are good first  choices.  Avoid foods that have acid, are spicy, fatty or fibrous (such as meats, coarse grains, vegetables). You may start eating these foods again in about 3 days when you are better.  Sometimes treatment includes prescription medicine to prevent nausea and vomiting and to prevent diarrhea. If your doctor prescribes these for you, take them as directed.  Nonprescription medicine is available for the treatment of diarrhea and can be very effective.  If you use it, make sure you use the dose recommended on the package.  Avoid Lomotil. Check with your healthcare provider before you use any medicine for diarrhea.  Don t take ibuprofen, or other nonsteroidal anti-inflammatory medicines without checking with your healthcare provider.      Remember that you can always come back to the Emergency Department if you are not able to see your regular doctor in the amount of time listed above, if you get any new symptoms, or if there is anything that worries you.

## 2021-10-24 ENCOUNTER — HEALTH MAINTENANCE LETTER (OUTPATIENT)
Age: 26
End: 2021-10-24

## 2022-02-13 ENCOUNTER — HEALTH MAINTENANCE LETTER (OUTPATIENT)
Age: 27
End: 2022-02-13

## 2022-10-15 ENCOUNTER — HEALTH MAINTENANCE LETTER (OUTPATIENT)
Age: 27
End: 2022-10-15

## 2023-01-31 ENCOUNTER — E-VISIT (OUTPATIENT)
Dept: URGENT CARE | Facility: CLINIC | Age: 28
End: 2023-01-31
Payer: COMMERCIAL

## 2023-01-31 DIAGNOSIS — J01.90 ACUTE SINUSITIS, RECURRENCE NOT SPECIFIED, UNSPECIFIED LOCATION: Primary | ICD-10-CM

## 2023-01-31 PROCEDURE — 99421 OL DIG E/M SVC 5-10 MIN: CPT | Performed by: PHYSICIAN ASSISTANT

## 2023-01-31 NOTE — PATIENT INSTRUCTIONS

## 2023-02-13 ENCOUNTER — OFFICE VISIT (OUTPATIENT)
Dept: FAMILY MEDICINE | Facility: CLINIC | Age: 28
End: 2023-02-13
Payer: COMMERCIAL

## 2023-02-13 VITALS
HEIGHT: 63 IN | TEMPERATURE: 98.8 F | DIASTOLIC BLOOD PRESSURE: 62 MMHG | OXYGEN SATURATION: 100 % | SYSTOLIC BLOOD PRESSURE: 104 MMHG | WEIGHT: 187 LBS | HEART RATE: 92 BPM | BODY MASS INDEX: 33.13 KG/M2 | RESPIRATION RATE: 14 BRPM

## 2023-02-13 DIAGNOSIS — N92.1 BREAKTHROUGH BLEEDING ON NEXPLANON: ICD-10-CM

## 2023-02-13 DIAGNOSIS — F41.1 GAD (GENERALIZED ANXIETY DISORDER): ICD-10-CM

## 2023-02-13 DIAGNOSIS — Z00.00 ROUTINE GENERAL MEDICAL EXAMINATION AT A HEALTH CARE FACILITY: Primary | ICD-10-CM

## 2023-02-13 DIAGNOSIS — E66.9 OBESITY (BMI 30-39.9): ICD-10-CM

## 2023-02-13 DIAGNOSIS — Z11.4 SCREENING FOR HIV (HUMAN IMMUNODEFICIENCY VIRUS): ICD-10-CM

## 2023-02-13 DIAGNOSIS — Z97.5 BREAKTHROUGH BLEEDING ON NEXPLANON: ICD-10-CM

## 2023-02-13 LAB
ALBUMIN SERPL BCG-MCNC: 4.3 G/DL (ref 3.5–5.2)
ALP SERPL-CCNC: 79 U/L (ref 35–104)
ALT SERPL W P-5'-P-CCNC: 10 U/L (ref 10–35)
ANION GAP SERPL CALCULATED.3IONS-SCNC: 11 MMOL/L (ref 7–15)
AST SERPL W P-5'-P-CCNC: 18 U/L (ref 10–35)
BILIRUB SERPL-MCNC: 0.3 MG/DL
BUN SERPL-MCNC: 11.7 MG/DL (ref 6–20)
CALCIUM SERPL-MCNC: 9.8 MG/DL (ref 8.6–10)
CHLORIDE SERPL-SCNC: 103 MMOL/L (ref 98–107)
CREAT SERPL-MCNC: 0.68 MG/DL (ref 0.51–0.95)
DEPRECATED HCO3 PLAS-SCNC: 23 MMOL/L (ref 22–29)
ERYTHROCYTE [DISTWIDTH] IN BLOOD BY AUTOMATED COUNT: 12.9 % (ref 10–15)
GFR SERPL CREATININE-BSD FRML MDRD: >90 ML/MIN/1.73M2
GLUCOSE SERPL-MCNC: 99 MG/DL (ref 70–99)
HCT VFR BLD AUTO: 41.7 % (ref 35–47)
HGB BLD-MCNC: 13.7 G/DL (ref 11.7–15.7)
MCH RBC QN AUTO: 27.9 PG (ref 26.5–33)
MCHC RBC AUTO-ENTMCNC: 32.9 G/DL (ref 31.5–36.5)
MCV RBC AUTO: 85 FL (ref 78–100)
PLATELET # BLD AUTO: 359 10E3/UL (ref 150–450)
POTASSIUM SERPL-SCNC: 4.1 MMOL/L (ref 3.4–5.3)
PROT SERPL-MCNC: 6.9 G/DL (ref 6.4–8.3)
RBC # BLD AUTO: 4.91 10E6/UL (ref 3.8–5.2)
SODIUM SERPL-SCNC: 137 MMOL/L (ref 136–145)
TSH SERPL DL<=0.005 MIU/L-ACNC: 1.65 UIU/ML (ref 0.3–4.2)
WBC # BLD AUTO: 6.2 10E3/UL (ref 4–11)

## 2023-02-13 PROCEDURE — 99395 PREV VISIT EST AGE 18-39: CPT | Performed by: PHYSICIAN ASSISTANT

## 2023-02-13 PROCEDURE — 84443 ASSAY THYROID STIM HORMONE: CPT | Performed by: PHYSICIAN ASSISTANT

## 2023-02-13 PROCEDURE — 85027 COMPLETE CBC AUTOMATED: CPT | Performed by: PHYSICIAN ASSISTANT

## 2023-02-13 PROCEDURE — 87389 HIV-1 AG W/HIV-1&-2 AB AG IA: CPT | Performed by: PHYSICIAN ASSISTANT

## 2023-02-13 PROCEDURE — 80053 COMPREHEN METABOLIC PANEL: CPT | Performed by: PHYSICIAN ASSISTANT

## 2023-02-13 PROCEDURE — 36415 COLL VENOUS BLD VENIPUNCTURE: CPT | Performed by: PHYSICIAN ASSISTANT

## 2023-02-13 RX ORDER — FEXOFENADINE HCL 180 MG/1
180 TABLET ORAL DAILY
Qty: 30 TABLET | Refills: 0 | COMMUNITY
Start: 2023-02-13

## 2023-02-13 ASSESSMENT — PAIN SCALES - GENERAL: PAINLEVEL: NO PAIN (0)

## 2023-02-13 ASSESSMENT — ENCOUNTER SYMPTOMS
PARESTHESIAS: 0
COUGH: 0
DIARRHEA: 0
PALPITATIONS: 0
NERVOUS/ANXIOUS: 1
JOINT SWELLING: 0
HEMATURIA: 0
DYSURIA: 0
HEARTBURN: 0
DIZZINESS: 0
HEADACHES: 1
SORE THROAT: 0
SHORTNESS OF BREATH: 0
CONSTIPATION: 0
EYE PAIN: 0
FEVER: 0
FREQUENCY: 0
ARTHRALGIAS: 0
CHILLS: 0
WEAKNESS: 0
ABDOMINAL PAIN: 0
HEMATOCHEZIA: 0
NAUSEA: 0
MYALGIAS: 0
BREAST MASS: 0

## 2023-02-13 NOTE — PROGRESS NOTES
SUBJECTIVE:   CC: Clayton is an 27 year old who presents for preventive health visit.       Patient has been advised of split billing requirements and indicates understanding: Yes        Healthy Habits:     Bi-annual eye exam:  NO    Dental care twice a year:  Yes    Sleep apnea or symptoms of sleep apnea:  None    Diet:  Regular (no restrictions)    Frequency of exercise:  2-3 days/week    Duration of exercise:  30-45 minutes    Taking medications regularly:  No    Barriers to taking medications:  Problems remembering to take them    Medication side effects:  None    PHQ-2 Total Score: 1    Additional concerns today:  Yes     [facility], please see care everywhere     Working out more regularly; with   Using [employer] to help with weight loss        Patient would like to talk about having frequent periods  She mentions this has been ongoing for around one year  Started using period tracker   -seemed to be happening 3x month, last around 5 days   -each one would have legit and cramping   -Nexplanon was placed in 2019  States at times will have three periods a month      Today's PHQ-2 Score:   PHQ-2 ( 1999 [employer]) 2/13/2023   Q1: Little interest or pleasure in doing things 1   Q2: Feeling down, depressed or hopeless 0   PHQ-2 Score 1   PHQ-2 Total Score (12-17 Years)- Positive if 3 or more points; Administer PHQ-A if positive -   Q1: Little interest or pleasure in doing things Several days   Q2: Feeling down, depressed or hopeless Not at all   PHQ-2 Score 1     Have you ever done Advance Care Planning? (For example, a Health Directive, POLST, or a discussion with a medical provider or your loved ones about your wishes): No, advance care planning information given to patient to review.  Patient declined advance care planning discussion at this time.    Social History     Tobacco Use     Smoking status: Never     Smokeless tobacco: Never   Substance Use Topics     Alcohol use: Yes     If you  drink alcohol do you typically have >3 drinks per day or >7 drinks per week? No    Alcohol Use 2/13/2023   Prescreen: >3 drinks/day or >7 drinks/week? No   Prescreen: >3 drinks/day or >7 drinks/week? -   No flowsheet data found.    Reviewed orders with patient.  Reviewed health maintenance and updated orders accordingly - Yes  Lab work is in process  Labs reviewed in EPIC    Breast Cancer Screening:    FHS-7:   Breast CA Risk Assessment (FHS-7) 1/9/2023 1/9/2023 2/13/2023   Did any of your first-degree relatives have breast or ovarian cancer? No No No   Did any of your relatives have bilateral breast cancer? No No No   Did any man in your family have breast cancer? No No No   Did any woman in your family have breast and ovarian cancer? No No No   Did any woman in your family have breast cancer before age 50 y? No No No   Do you have 2 or more relatives with breast and/or ovarian cancer? Unknown Unknown Unknown   Do you have 2 or more relatives with breast and/or bowel cancer? No No No       Patient under 40 years of age: Routine Mammogram Screening not recommended.   Pertinent mammograms are reviewed under the imaging tab.    History of abnormal Pap smear: NO - age 21-29 PAP every 3 years recommended  PAP / HPV 12/16/2019   PAP (Historical) NIL     Reviewed and updated as needed this visit by clinical staff    Allergies  Meds              Reviewed and updated as needed this visit by Provider                   Review of Systems   Constitutional: Negative for chills and fever.   HENT: Positive for congestion. Negative for ear pain, hearing loss and sore throat.    Eyes: Negative for pain and visual disturbance.   Respiratory: Negative for cough and shortness of breath.    Cardiovascular: Negative for chest pain, palpitations and peripheral edema.   Gastrointestinal: Negative for abdominal pain, constipation, diarrhea, heartburn, hematochezia and nausea.   Breasts:  Negative for tenderness, breast mass and discharge.  "  Genitourinary: Positive for vaginal discharge. Negative for dysuria, frequency, genital sores, hematuria, pelvic pain and urgency.   Musculoskeletal: Negative for arthralgias, joint swelling and myalgias.   Skin: Negative for rash.   Neurological: Positive for headaches. Negative for dizziness, weakness and paresthesias.   Psychiatric/Behavioral: Negative for mood changes. The patient is nervous/anxious.       OBJECTIVE:   /62 (BP Location: Right arm, Patient Position: Sitting, Cuff Size: Adult Large)   Pulse 92   Temp 98.8  F (37.1  C) (Tympanic)   Resp 14   Ht 1.6 m (5' 3\")   Wt 84.8 kg (187 lb)   LMP 02/06/2023 (Approximate)   SpO2 100%   BMI 33.13 kg/m    Physical Exam  GENERAL: healthy, alert and no distress  EYES: Eyes grossly normal to inspection, PERRL and conjunctivae and sclerae normal  HENT: ear canals and TM's normal, nose and mouth without ulcers or lesions  NECK: no adenopathy, no asymmetry, masses, or scars and thyroid normal to palpation  RESP: lungs clear to auscultation - no rales, rhonchi or wheezes  CV: regular rate and rhythm, normal S1 S2, no S3 or S4, no murmur, click or rub, no peripheral edema and peripheral pulses strong  ABDOMEN: soft, nontender, no hepatosplenomegaly, no masses and bowel sounds normal  MS: no gross musculoskeletal defects noted, no edema  SKIN: no suspicious lesions or rashes  PSYCH: mentation appears normal, affect normal/bright    Diagnostic Test Results:  Labs reviewed in Epic  Updating today    ASSESSMENT/PLAN:   1. Routine general medical examination at a health care facility  Reviewed personal and family history. Reviewed age appropriate screenings. Recommended any needed vaccinations.      2. Screening for HIV (human immunodeficiency virus)  - HIV Antigen Antibody Combo; Future  - HIV Antigen Antibody Combo    3. Obesity (BMI 30-39.9)  Reviewed some of her weight gain. She has been very good about initiating an exercise regimen and changes in diet. "     4. SERENA (generalized anxiety disorder)  She self-tapered off of sertraline around 1 year ago and continues to feel well. No changes to therapy needed at this time    5. Breakthrough bleeding on Nexplanon  She mentions insertion in 2019. She is 3+ years out from that. Periods are increasingly frequent and has some additional questions aobut alternative hormonal methods, as well as PCOS and in need of a PAP. We'll send her to GYN  - CBC with platelets; Future  - TSH with free T4 reflex; Future  - Comprehensive metabolic panel (BMP + Alb, Alk Phos, ALT, AST, Total. Bili, TP); Future  - Ob/Gyn Referral; Future  - CBC with platelets  - TSH with free T4 reflex  - Comprehensive metabolic panel (BMP + Alb, Alk Phos, ALT, AST, Total. Bili, TP)      COUNSELING:  Reviewed preventive health counseling, as reflected in patient instructions        She reports that she has never smoked. She has never used smokeless tobacco.          Pramod Song PA-C  [facility]

## 2023-02-14 LAB — HIV 1+2 AB+HIV1 P24 AG SERPL QL IA: NONREACTIVE

## 2023-04-03 ENCOUNTER — OFFICE VISIT (OUTPATIENT)
Dept: OBGYN | Facility: CLINIC | Age: 28
End: 2023-04-03
Payer: COMMERCIAL

## 2023-04-03 VITALS
DIASTOLIC BLOOD PRESSURE: 82 MMHG | HEIGHT: 63 IN | BODY MASS INDEX: 33.49 KG/M2 | WEIGHT: 189 LBS | SYSTOLIC BLOOD PRESSURE: 132 MMHG

## 2023-04-03 DIAGNOSIS — Z97.5 NEXPLANON IN PLACE: ICD-10-CM

## 2023-04-03 DIAGNOSIS — Z11.3 SCREEN FOR STD (SEXUALLY TRANSMITTED DISEASE): ICD-10-CM

## 2023-04-03 DIAGNOSIS — N92.1 BREAKTHROUGH BLEEDING ON NEXPLANON: Primary | ICD-10-CM

## 2023-04-03 DIAGNOSIS — Z12.4 SCREENING FOR MALIGNANT NEOPLASM OF CERVIX: ICD-10-CM

## 2023-04-03 DIAGNOSIS — Z97.5 BREAKTHROUGH BLEEDING ON NEXPLANON: Primary | ICD-10-CM

## 2023-04-03 PROCEDURE — G0145 SCR C/V CYTO,THINLAYER,RESCR: HCPCS | Performed by: OBSTETRICS & GYNECOLOGY

## 2023-04-03 PROCEDURE — 87591 N.GONORRHOEAE DNA AMP PROB: CPT | Performed by: OBSTETRICS & GYNECOLOGY

## 2023-04-03 PROCEDURE — 99203 OFFICE O/P NEW LOW 30 MIN: CPT | Performed by: OBSTETRICS & GYNECOLOGY

## 2023-04-03 PROCEDURE — 87624 HPV HI-RISK TYP POOLED RSLT: CPT | Performed by: OBSTETRICS & GYNECOLOGY

## 2023-04-03 PROCEDURE — 87491 CHLMYD TRACH DNA AMP PROBE: CPT | Performed by: OBSTETRICS & GYNECOLOGY

## 2023-04-03 NOTE — PROGRESS NOTES
"  Assessment & Plan     Breakthrough bleeding on Nexplanon  - Need to r/o STDs, but o/w this is likely due to Nexplanon being 1 year   - NEISSERIA GONORRHOEA PCR  - CHLAMYDIA TRACHOMATIS PCR    Screening for malignant neoplasm of cervix  - Due for screening  - Pap imaged thin layer screen with HPV - recommended age 30 - 65 years (select HPV order below)    Screen for STD (sexually transmitted disease)  - Need to confirm is negative given above BTB  - NEISSERIA GONORRHOEA PCR  - CHLAMYDIA TRACHOMATIS PCR    Nexplanon in place ()  - , was inserted 2019.  Pt apparently under the impression it was good for 5 years, but review of  info confirms Nexplanon is still only effective for 3 years.  - Pt to use condoms should she become sexually active.  - Pt to make appt w/ Nexplanon provider for removal and possible reinsertion vs change to other contraception as desired.               BMI:   Estimated body mass index is 33.48 kg/m  as calculated from the following:    Height as of this encounter: 1.6 m (5' 3\").    Weight as of this encounter: 85.7 kg (189 lb).           No follow-ups on file.    Ponce Pierre MD  [facility] Arnold is a 28 year old, presenting for the following health issues:  Gyn Exam (Irregular cycles with Nexplanon )         View : No data to display.              Pt here for irregular VB for the last several months.  She had a Nexplanon placed 2019 by another provider, and was given the impression it was good for 5 years.  However review today of  specs on Nexplanon confirms it is still only effective for 3 years.  She does state her menses were mostly absent for the first 3 years, which is c/w what is typical.  She has not had sex > 5 years.  She is due for Pap.  She had preventative visit w/ PCP 2023.  CBC and TSH WNL.               Review of Systems         Objective    /82 (BP Location: " "Right arm, Patient Position: Sitting, Cuff Size: Adult Regular)   Ht 1.6 m (5' 3\")   Wt 85.7 kg (189 lb)   LMP 03/31/2023 (Approximate)   BMI 33.48 kg/m    Body mass index is 33.48 kg/m .  Physical Exam   Pelvic- Exam chaperoned by nurse, External genitalia normal, Bartholin's glands normal, Plains's glands normal, Urethral meatus normal, Urethra normal, Bladder normal, Vagina with normal rugae, no abnormal lesions, no abnormal discharge, Normal cervix without lesions or mucopus, no cervical motion tenderness, Uterus normal size, shape, and contour, no masses, non-tender, Adnexa normal size without masses or tenderness bilaterally, Anus normal, Pelvic exam limited by obesity, Pap smear was Done,  HPV Not Done, GC/Chlam probe was Done                      "

## 2023-04-03 NOTE — NURSING NOTE
"Chief Complaint   Patient presents with     Gyn Exam     Irregular cycles with Nexplanon        Initial /82 (BP Location: Right arm, Patient Position: Sitting, Cuff Size: Adult Regular)   Ht 1.6 m (5' 3\")   Wt 85.7 kg (189 lb)   LMP 2023 (Approximate)   BMI 33.48 kg/m   Estimated body mass index is 33.48 kg/m  as calculated from the following:    Height as of this encounter: 1.6 m (5' 3\").    Weight as of this encounter: 85.7 kg (189 lb).  BP completed using cuff size: regular    Questioned patient about current smoking habits.  Pt. has never smoked.          The following HM Due: NONE    Adriano Kelly CMA              "

## 2023-04-04 LAB
C TRACH DNA SPEC QL NAA+PROBE: NEGATIVE
N GONORRHOEA DNA SPEC QL NAA+PROBE: NEGATIVE

## 2023-04-05 LAB
BKR LAB AP GYN ADEQUACY: NORMAL
BKR LAB AP GYN INTERPRETATION: NORMAL
BKR LAB AP HPV REFLEX: NORMAL
BKR LAB AP LMP: NORMAL
BKR LAB AP PREVIOUS ABNORMAL: NORMAL
PATH REPORT.COMMENTS IMP SPEC: NORMAL
PATH REPORT.COMMENTS IMP SPEC: NORMAL
PATH REPORT.RELEVANT HX SPEC: NORMAL

## 2023-04-07 LAB
HUMAN PAPILLOMA VIRUS 16 DNA: NEGATIVE
HUMAN PAPILLOMA VIRUS 18 DNA: NEGATIVE
HUMAN PAPILLOMA VIRUS FINAL DIAGNOSIS: NORMAL
HUMAN PAPILLOMA VIRUS OTHER HR: NEGATIVE

## 2023-04-10 ENCOUNTER — OFFICE VISIT (OUTPATIENT)
Dept: MIDWIFE SERVICES | Facility: CLINIC | Age: 28
End: 2023-04-10
Payer: COMMERCIAL

## 2023-04-10 VITALS — WEIGHT: 188 LBS | DIASTOLIC BLOOD PRESSURE: 60 MMHG | SYSTOLIC BLOOD PRESSURE: 110 MMHG | BODY MASS INDEX: 33.3 KG/M2

## 2023-04-10 DIAGNOSIS — Z30.46 NEXPLANON REMOVAL: Primary | ICD-10-CM

## 2023-04-10 PROCEDURE — 11982 REMOVE DRUG IMPLANT DEVICE: CPT | Performed by: ADVANCED PRACTICE MIDWIFE

## 2023-04-10 NOTE — PROGRESS NOTES
Nexplanon Removal:     Is a pregnancy test required: No. Not currently sexually active.  Was a consent obtained?  Yes    Clayton العلي is here for removal of etonogestrel implant Nexplanon/Implanon    Indication: Desires to take a break from BC.    Preoperative Diagnosis: etonogestrel implant  Postoperative Diagnosis: etonogestrel implant removed    Technique: On the left arm  Skin prep Betadine  Anesthesia 1% lidocaine  Procedure: Small incision (<5mm) was made at distal end of palpable implant, curved hemostat or mosquito forceps was used to isolate the implant and bring it to the incision, the fibrous capsule containing the implant  was incised and the Implant was removed intact.      EBL: minimal  Complications:  No  Tolerance:  Pt tolerated procedure well and was in stable condition.   Dressing:    A pressure bandage was placed for the next 12-24 hours.    Contraception was discussed and patient chose the following method none, does not desire at this time.      Follow up: Pt was instructed to call if bleeding, severe pain or foul smell.     MAVERICK Watt CNM

## 2023-04-10 NOTE — NURSING NOTE
"Chief Complaint   Patient presents with     Minor Procedure     Nexplanon removal       Initial /60   Wt 85.3 kg (188 lb)   LMP 2023 (Approximate)   BMI 33.30 kg/m   Estimated body mass index is 33.3 kg/m  as calculated from the following:    Height as of 4/3/23: 1.6 m (5' 3\").    Weight as of this encounter: 85.3 kg (188 lb).  BP completed using cuff size: large    Questioned patient about current smoking habits.  Pt. has never smoked.             "

## 2023-09-22 ENCOUNTER — TELEPHONE (OUTPATIENT)
Dept: FAMILY MEDICINE | Facility: CLINIC | Age: 28
End: 2023-09-22
Payer: COMMERCIAL

## 2023-09-25 NOTE — TELEPHONE ENCOUNTER
Topic: Non-Medical Question.     Hello,   I am starting to look for apartments and I was wondering if I could have a letter for my cat since she has become an emotional support animal to help with my anxiety and depression since I am no longer needing medication. Thanks,  ~Clayton

## 2023-09-27 NOTE — TELEPHONE ENCOUNTER
Sent vIPtela message requesting a call back for an appt. Two more attempts will be made.    Fauzia Levin  Lead

## 2023-09-27 NOTE — TELEPHONE ENCOUNTER
I can do this but would need to do some sort of a visit since we haven't had one since 2021.  Virtual would be okay.  MATTIE

## 2023-10-09 ENCOUNTER — VIRTUAL VISIT (OUTPATIENT)
Dept: FAMILY MEDICINE | Facility: CLINIC | Age: 28
End: 2023-10-09
Payer: COMMERCIAL

## 2023-10-09 DIAGNOSIS — F41.1 GAD (GENERALIZED ANXIETY DISORDER): Primary | ICD-10-CM

## 2023-10-09 PROCEDURE — 99213 OFFICE O/P EST LOW 20 MIN: CPT | Mod: VID | Performed by: NURSE PRACTITIONER

## 2023-10-09 NOTE — PROGRESS NOTES
"Clayton is a 28 year old who is being evaluated via a billable video visit.      How would you like to obtain your AVS? MyChart  If the video visit is dropped, the invitation should be resent by: Text to cell phone: [phone]  Will anyone else be joining your video visit? No          Assessment & Plan     SERENA (generalized anxiety disorder)  Stable without medication but with use of emotional support animal.  Letter written.  Will complete paperwork if needed.       BMI:   Estimated body mass index is 33.3 kg/m  as calculated from the following:    Height as of 4/3/23: 1.6 m (5' 3\").    Weight as of 4/10/23: 85.3 kg (188 lb).           MAVERICK Partida Ra, CNP  [facility]    Shiraz Arnold is a 28 year old, presenting for the following health issues:  Letter Request (Would like a letter for KLEBER for her cat)        10/9/2023     2:07 PM   Additional Questions   Roomed by Nelda Duran CMA         10/9/2023     2:07 PM   Patient Reported Additional Medications   Patient reports taking the following new medications none       History of Present Illness       Reason for visit:  Get KLEBER letter for Cat    She eats 0-1 servings of fruits and vegetables daily.She consumes 1 sweetened beverage(s) daily.She exercises with enough effort to increase her heart rate 30 to 60 minutes per day.  She exercises with enough effort to increase her heart rate 5 days per week.   She is taking medications regularly.         Hx of anxiety.  Previously utilizing medication.  No meds for about 1 year.  Good symptom control.  Has an emotional support animal- cat.  She would like to be able to continue living with her cat after she moves out on her own.      Review of Systems   Constitutional, HEENT, cardiovascular, pulmonary, gi and gu systems are negative, except as otherwise noted.      Objective           Vitals:  No vitals were obtained today due to virtual visit.    Physical Exam   GENERAL: Healthy, alert " and no distress  EYES: Eyes grossly normal to inspection.  No discharge or erythema, or obvious scleral/conjunctival abnormalities.  RESP: No audible wheeze, cough, or visible cyanosis.  No visible retractions or increased work of breathing.    SKIN: Visible skin clear. No significant rash, abnormal pigmentation or lesions.  NEURO: Cranial nerves grossly intact.  Mentation and speech appropriate for age.  PSYCH: Mentation appears normal, affect normal/bright, judgement and insight intact, normal speech and appearance well-groomed.                Video-Visit Details    Type of service:  Video Visit     Originating Location (pt. Location): Home    Distant Location (provider location):  On-site  Platform used for Video Visit: Akash

## 2023-10-09 NOTE — LETTER
October 9, 2023      Clayton العلي  [address]  [place] [address]        To Whom It May Concern:    Clayton العلي  was seen on 10/9/23.  She has a history of anxiety and utilizes an emotional support animal (cat) for symptom control.  I have recommended that she be allowed to continue to utilize this intervention moving forward.  Please let me know if you have any questions or concerns.          Sincerely,        MAVERICK Partida Ra CNP

## 2024-01-18 ENCOUNTER — PATIENT OUTREACH (OUTPATIENT)
Dept: CARE COORDINATION | Facility: CLINIC | Age: 29
End: 2024-01-18
Payer: COMMERCIAL

## 2024-02-01 ENCOUNTER — PATIENT OUTREACH (OUTPATIENT)
Dept: CARE COORDINATION | Facility: CLINIC | Age: 29
End: 2024-02-01
Payer: COMMERCIAL

## 2024-03-04 ENCOUNTER — OFFICE VISIT (OUTPATIENT)
Dept: FAMILY MEDICINE | Facility: CLINIC | Age: 29
End: 2024-03-04
Payer: COMMERCIAL

## 2024-03-04 VITALS
HEIGHT: 63 IN | OXYGEN SATURATION: 100 % | BODY MASS INDEX: 31.17 KG/M2 | SYSTOLIC BLOOD PRESSURE: 138 MMHG | HEART RATE: 83 BPM | DIASTOLIC BLOOD PRESSURE: 71 MMHG | TEMPERATURE: 98.5 F | WEIGHT: 175.9 LBS | RESPIRATION RATE: 16 BRPM

## 2024-03-04 DIAGNOSIS — Z00.00 ROUTINE GENERAL MEDICAL EXAMINATION AT A HEALTH CARE FACILITY: Primary | ICD-10-CM

## 2024-03-04 DIAGNOSIS — F41.1 GAD (GENERALIZED ANXIETY DISORDER): ICD-10-CM

## 2024-03-04 DIAGNOSIS — E66.9 OBESITY (BMI 30-39.9): ICD-10-CM

## 2024-03-04 DIAGNOSIS — F33.42 RECURRENT MAJOR DEPRESSIVE DISORDER, IN FULL REMISSION (H): ICD-10-CM

## 2024-03-04 PROBLEM — F32.A DEPRESSION: Status: ACTIVE | Noted: 2022-05-16

## 2024-03-04 PROCEDURE — 99395 PREV VISIT EST AGE 18-39: CPT | Mod: 25

## 2024-03-04 PROCEDURE — 90471 IMMUNIZATION ADMIN: CPT

## 2024-03-04 PROCEDURE — 99213 OFFICE O/P EST LOW 20 MIN: CPT | Mod: 25

## 2024-03-04 PROCEDURE — 90715 TDAP VACCINE 7 YRS/> IM: CPT

## 2024-03-04 SDOH — HEALTH STABILITY: PHYSICAL HEALTH: ON AVERAGE, HOW MANY DAYS PER WEEK DO YOU ENGAGE IN MODERATE TO STRENUOUS EXERCISE (LIKE A BRISK WALK)?: 3 DAYS

## 2024-03-04 SDOH — HEALTH STABILITY: PHYSICAL HEALTH: ON AVERAGE, HOW MANY MINUTES DO YOU ENGAGE IN EXERCISE AT THIS LEVEL?: 50 MIN

## 2024-03-04 ASSESSMENT — SOCIAL DETERMINANTS OF HEALTH (SDOH): HOW OFTEN DO YOU GET TOGETHER WITH FRIENDS OR RELATIVES?: ONCE A WEEK

## 2024-03-04 NOTE — PROGRESS NOTES
Preventive Care Visit  [facility]  MAVERICK Tipton CNP, Family Medicine  Mar 4, 2024      Assessment & Plan   Problem List Items Addressed This Visit       SERENA (generalized anxiety disorder)     Stable without the use of medications. She reports she has been on sertraline historically while she was in graduate school, but now that she has finished her mood has stabilized. Good support at home and utilizes supportive cares such as exercise.         Obesity (BMI 30-39.9)     BMI today is 31.16. We discussed metabolic strategies as it pertains to obesity and weight. She has optimized a lot of lifestyle factors. Metabolic labs including TSH and hemoglobin A1C have been normal. She is not on hormonal birth control. Discussed she is lifting, so some weight loss may be obscured by muscle. She can try to increase cardio exercise. She is interested in discussing with dietician and this order was placed today. She would be a candidate for medical weight management but she is not interested at this time.          Relevant Orders    Nutrition Referral    Recurrent major depressive disorder, in full remission (H24)     Stable without the use of medications. She reports she has been on sertraline historically while she was in graduate school, but now that she has finished her mood has stabilized. Good support at home and utilizes supportive cares such as exercise.          Routine general medical examination at a health care facility - Primary     Annual exam  PAP: UTD  Mammogram/Colonoscopy: Not indicated  Immunizations: TDAP today.  Labs: Discussed and offered. None desired or indicated.  Mood: As documented.  BMI: As documented  Discussed bone health  STI screening: declined  Contraception: None. Not sexually active.  Follow up in one year for annual exam or sooner if needed/indicated.            BMI  Estimated body mass index is 31.16 kg/m  as calculated from the following:    Height as of this  "encounter: 1.6 m (5' 3\").    Weight as of this encounter: 79.8 kg (175 lb 14.4 oz).   Weight management plan: Discussed healthy diet and exercise guidelines    Counseling  Appropriate preventive services were discussed with this patient, including applicable screening as appropriate for fall prevention, nutrition, physical activity, Tobacco-use cessation, weight loss and cognition.  Checklist reviewing preventive services available has been given to the patient.  Reviewed patient's diet, addressing concerns and/or questions.   She is at risk for lack of exercise and has been provided with information to increase physical activity for the benefit of her well-being.   She is at risk for psychosocial distress and has been provided with information to reduce risk.     Shiraz Arnold is a 28 year old, presenting for the following:  Physical and discuss weight        3/4/2024    11:11 AM   Additional Questions   Roomed by ac   Accompanied by self        Health Care Directive  Patient does not have a Health Care Directive or Living Will: Discussed advance care planning with patient; information given to patient to review.    Hoping to discuss weight.  Lost 5lbs in the last year; would like more.  Currently calorie counting ([id number]), meal prepping. Minimizes snacking. No sugary drinks. Plenty of water. She gets around 100g of protein in a day.   Exercises 3-4 times weekly for 45-60 minutes, weight lifting and cardio.           3/4/2024   General Health   How would you rate your overall physical health? Good   Feel stress (tense, anxious, or unable to sleep) Only a little   (!) STRESS CONCERN      3/4/2024   Nutrition   Three or more servings of calcium each day? Yes   Diet: Regular (no restrictions)   How many servings of fruit and vegetables per day? (!) 2-3   How many sweetened beverages each day? 0-1         3/4/2024   Exercise   Days per week of moderate/strenous exercise 3 days   Average minutes spent exercising " at this level 50 min         3/4/2024   Social Factors   Frequency of gathering with friends or relatives Once a week   Worry food won't last until get money to buy more No   Food not last or not have enough money for food? No   Do you have housing?  Yes   Are you worried about losing your housing? No   Lack of transportation? No   Unable to get utilities (heat,electricity)? No         3/4/2024   Dental   Dentist two times every year? Yes     Today's PHQ-2 Score:       3/4/2024    10:58 AM   PHQ-2 ( 1999 [employer])   Q1: Little interest or pleasure in doing things 0   Q2: Feeling down, depressed or hopeless 0   PHQ-2 Score 0   Q1: Little interest or pleasure in doing things Not at all   Q2: Feeling down, depressed or hopeless Not at all   PHQ-2 Score 0         3/4/2024   Substance Use   Alcohol more than 3/day or more than 7/wk No   Do you use any other substances recreationally? No     Social History     Tobacco Use    Smoking status: Never    Smokeless tobacco: Never   Vaping Use    Vaping Use: Never used   Substance Use Topics    Alcohol use: Yes    Drug use: No         2/13/2023   LAST FHS-7 RESULTS   1st degree relative breast or ovarian cancer No   Any relative bilateral breast cancer No   Any male have breast cancer No   Any ONE woman have BOTH breast AND ovarian cancer No   Any woman with breast cancer before 50yrs No   2 or more relatives with breast AND/OR ovarian cancer Unknown   2 or more relatives with breast AND/OR bowel cancer No     Mammogram Screening - Patient under 40 years of age: Routine Mammogram Screening not recommended.         3/4/2024   STI Screening   New sexual partner(s) since last STI/HIV test? No     History of abnormal Pap smear: NO - age 21-29 PAP every 3 years recommended        Latest Ref Rng & Units 4/3/2023    10:22 AM 12/16/2019     3:09 PM   PAP / HPV   PAP  Negative for Intraepithelial Lesion or Malignancy (NILM)     PAP (Historical)   NIL    HPV 16 DNA Negative Negative     HPV  "18 DNA Negative Negative     Other HR HPV Negative Negative             3/4/2024   Contraception/Family Planning   Questions about contraception or family planning No     Reviewed and updated as needed this visit by Provider   Tobacco  Allergies  Meds  Problems  Med Hx  Surg Hx  Fam Hx             Objective    Exam  /71 (BP Location: Left arm, Patient Position: Sitting, Cuff Size: Adult Large)   Pulse 83   Temp 98.5  F (36.9  C) (Oral)   Resp 16   Ht 1.6 m (5' 3\")   Wt 79.8 kg (175 lb 14.4 oz)   LMP 02/23/2024 (Exact Date)   SpO2 100%   Breastfeeding No   BMI 31.16 kg/m     Estimated body mass index is 31.16 kg/m  as calculated from the following:    Height as of this encounter: 1.6 m (5' 3\").    Weight as of this encounter: 79.8 kg (175 lb 14.4 oz).    Physical Exam  GENERAL: alert and no distress  EYES: Eyes grossly normal to inspection, PERRL and conjunctivae and sclerae normal  HENT: ear canals and TM's normal, nose and mouth without ulcers or lesions  NECK: no adenopathy, no asymmetry, masses, or scars  RESP: lungs clear to auscultation - no rales, rhonchi or wheezes  BREAST: normal without masses, tenderness or nipple discharge and no palpable axillary masses or adenopathy  CV: regular rate and rhythm, normal S1 S2, no S3 or S4, no murmur, click or rub, no peripheral edema  ABDOMEN: soft, nontender, no hepatosplenomegaly, no masses and bowel sounds normal  MS: no gross musculoskeletal defects noted, no edema  SKIN: no suspicious lesions or rashes  NEURO: Normal strength and tone, mentation intact and speech normal  PSYCH: mentation appears normal, affect normal/bright      Signed Electronically by: MAVERICK Tipton CNP    "

## 2024-03-04 NOTE — ASSESSMENT & PLAN NOTE
Stable without the use of medications. She reports she has been on sertraline historically while she was in graduate school, but now that she has finished her mood has stabilized. Good support at home and utilizes supportive cares such as exercise.

## 2024-03-04 NOTE — ASSESSMENT & PLAN NOTE
BMI today is 31.16. We discussed metabolic strategies as it pertains to obesity and weight. She has optimized a lot of lifestyle factors. Metabolic labs including TSH and hemoglobin A1C have been normal. She is not on hormonal birth control. Discussed she is lifting, so some weight loss may be obscured by muscle. She can try to increase cardio exercise. She is interested in discussing with dietician and this order was placed today. She would be a candidate for medical weight management but she is not interested at this time.

## 2024-03-04 NOTE — PATIENT INSTRUCTIONS
Preventive Care Advice   This is general advice given by our system to help you stay healthy. However, your care team may have specific advice just for you. Please talk to your care team about your preventive care needs.  Nutrition  Eat 5 or more servings of fruits and vegetables each day.  Try wheat bread, brown rice and whole grain pasta (instead of white bread, rice, and pasta).  Get enough calcium and vitamin D. Check the label on foods and aim for 100% of the RDA (recommended daily allowance).  Lifestyle  Exercise at least 150 minutes each week   (30 minutes a day, 5 days a week).  Do muscle strengthening activities 2 days a week. These help control your weight and prevent disease.  No smoking.  Wear sunscreen to prevent skin cancer.  Have a dental exam and cleaning every 6 months.  Yearly exams  See your health care team every year to talk about:  Any changes in your health.  Any medicines your care team has prescribed.  Preventive care, family planning, and ways to prevent chronic diseases.  Shots (vaccines)   HPV shots (up to age 26), if you've never had them before.  Hepatitis B shots (up to age 59), if you've never had them before.  COVID-19 shot: Get this shot when it's due.  Flu shot: Get a flu shot every year.  Tetanus shot: Get a tetanus shot every 10 years.  Pneumococcal, hepatitis A, and RSV shots: Ask your care team if you need these based on your risk.  Shingles shot (for age 50 and up).  General health tests  Diabetes screening:  Starting at age 35, Get screened for diabetes at least every 3 years.  If you are younger than age 35, ask your care team if you should be screened for diabetes.  Cholesterol test: At age 39, start having a cholesterol test every 5 years, or more often if advised.  Bone density scan (DEXA): At age 50, ask your care team if you should have this scan for osteoporosis (brittle bones).  Hepatitis C: Get tested at least once in your life.  STIs (sexually transmitted  infections)  Before age 24: Ask your care team if you should be screened for STIs.  After age 24: Get screened for STIs if you're at risk. You are at risk for STIs (including HIV) if:  You are sexually active with more than one person.  You don't use condoms every time.  You or a partner was diagnosed with a sexually transmitted infection.  If you are at risk for HIV, ask about PrEP medicine to prevent HIV.  Get tested for HIV at least once in your life, whether you are at risk for HIV or not.  Cancer screening tests  Cervical cancer screening: If you have a cervix, begin getting regular cervical cancer screening tests at age 21. Most people who have regular screenings with normal results can stop after age 65. Talk about this with your provider.  Breast cancer scan (mammogram): If you've ever had breasts, begin having regular mammograms starting at age 40. This is a scan to check for breast cancer.  Colon cancer screening: It is important to start screening for colon cancer at age 45.  Have a colonoscopy test every 10 years (or more often if you're at risk) Or, ask your provider about stool tests like a FIT test every year or Cologuard test every 3 years.  To learn more about your testing options, visit: https://www.[employer]/469281.pdf.  For help making a decision, visit: https://bit.ly/ov57583.  Prostate cancer screening test: If you have a prostate and are age 55 to 69, ask your provider if you would benefit from a yearly prostate cancer screening test.  Lung cancer screening: If you are a current or former smoker age 50 to 80, ask your care team if ongoing lung cancer screenings are right for you.  For informational purposes only. Not to replace the advice of your health care provider. Copyright   2023 [employer]. All rights reserved. Clinically reviewed by the [facility]. [employer] 212696 - REV 01/24.    Learning About Stress  What is stress?     Stress is your  body's response to a hard situation. Your body can have a physical, emotional, or mental response. Stress is a fact of life for most people, and it affects everyone differently. What causes stress for you may not be stressful for someone else.  A lot of things can cause stress. You may feel stress when you go on a job interview, take a test, or run a race. This kind of short-term stress is normal and even useful. It can help you if you need to work hard or react quickly. For example, stress can help you finish an important job on time.  Long-term stress is caused by ongoing stressful situations or events. Examples of long-term stress include long-term health problems, ongoing problems at work, or conflicts in your family. Long-term stress can harm your health.  How does stress affect your health?  When you are stressed, your body responds as though you are in danger. It makes hormones that speed up your heart, make you breathe faster, and give you a burst of energy. This is called the fight-or-flight stress response. If the stress is over quickly, your body goes back to normal and no harm is done.  But if stress happens too often or lasts too long, it can have bad effects. Long-term stress can make you more likely to get sick, and it can make symptoms of some diseases worse. If you tense up when you are stressed, you may develop neck, shoulder, or low back pain. Stress is linked to high blood pressure and heart disease.  Stress also harms your emotional health. It can make you jamison, tense, or depressed. Your relationships may suffer, and you may not do well at work or school.  What can you do to manage stress?  You can try these things to help manage stress:   Do something active. Exercise or activity can help reduce stress. Walking is a great way to get started. Even everyday activities such as housecleaning or yard work can help.  Try yoga or rima chi. These techniques combine exercise and meditation. You may need  some training at first to learn them.  Do something you enjoy. For example, listen to music or go to a movie. Practice your hobby or do volunteer work.  Meditate. This can help you relax, because you are not worrying about what happened before or what may happen in the future.  Do guided imagery. Imagine yourself in any setting that helps you feel calm. You can use online videos, books, or a teacher to guide you.  Do breathing exercises. For example:  From a standing position, bend forward from the waist with your knees slightly bent. Let your arms dangle close to the floor.  Breathe in slowly and deeply as you return to a standing position. Roll up slowly and lift your head last.  Hold your breath for just a few seconds in the standing position.  Breathe out slowly and bend forward from the waist.  Let your feelings out. Talk, laugh, cry, and express anger when you need to. Talking with supportive friends or family, a counselor, or a katina leader about your feelings is a healthy way to relieve stress. Avoid discussing your feelings with people who make you feel worse.  Write. It may help to write about things that are bothering you. This helps you find out how much stress you feel and what is causing it. When you know this, you can find better ways to cope.  What can you do to prevent stress?  You might try some of these things to help prevent stress:  Manage your time. This helps you find time to do the things you want and need to do.  Get enough sleep. Your body recovers from the stresses of the day while you are sleeping.  Get support. Your family, friends, and community can make a difference in how you experience stress.  Limit your news feed. Avoid or limit time on social media or news that may make you feel stressed.  Do something active. Exercise or activity can help reduce stress. Walking is a great way to get started.  Where can you learn more?  Go to https://www.healthwise.net/patiented  Enter N032 in the  "search box to learn more about \"Learning About Stress.\"  Current as of: February 26, 2023               Content Version: 13.8    1283-9232 [employer].   Care instructions adapted under license by your healthcare professional. If you have questions about a medical condition or this instruction, always ask your healthcare professional. [employer] disclaims any warranty or liability for your use of this information.      "

## 2024-03-04 NOTE — ASSESSMENT & PLAN NOTE
Annual exam  PAP: UTD  Mammogram/Colonoscopy: Not indicated  Immunizations: TDAP today.  Labs: Discussed and offered. None desired or indicated.  Mood: As documented.  BMI: As documented  Discussed bone health  STI screening: declined  Contraception: None. Not sexually active.  Follow up in one year for annual exam or sooner if needed/indicated.

## 2024-05-13 ENCOUNTER — HOSPITAL ENCOUNTER (OUTPATIENT)
Dept: NUTRITION | Facility: CLINIC | Age: 29
Discharge: HOME | End: 2024-05-13
Payer: COMMERCIAL

## 2024-05-13 DIAGNOSIS — E66.9 OBESITY (BMI 30-39.9): ICD-10-CM

## 2024-05-13 PROCEDURE — 97802 MEDICAL NUTRITION INDIV IN: CPT | Mod: GT,95

## 2024-05-13 NOTE — PROGRESS NOTES
OUTPATIENT CLINICAL NUTRITION SERVICES ASSESSMENT    Video-Visit Details     Type of service: Video Visit     Video Start Time (time video started): 11:01 am     Video End Time (time video stopped): 11:31 am    Originating Location (pt. Location): Home      Distant Location (provider location): On-site     Mode of Communication: Video Conference via [employer]    REASON FOR ASSESSMENT  Clayton العلي referred by Jaylin Mauro APRN CNP for MNT related to  Obesity (BMI 30-39.9) [E66.9]    Weight Loss - Overweight/Obesity    Patient accompanied by: N/A      ASSESSMENT   -PMH: SERENA, allergic rhinitis due to animals, obesity, recurrent major depressive disorder in full remission    What brings you to our session today? Have you met with an RD in the past?  Was previously working with RD at [facility] [employer] but was not able to see provider frequently. Reports being overweight for whole life, has been wanting to focus on improving eating and exercise habits. Would like to be overall healthier. Does not snack a lot but does recognize that does not eat a lot of healthy foods. Notes need to improve fruit and vegetable intake - feels like needs to hide them in food or force self to eat them.    Wants to discuss ways to help eat better and lose weight.     Nutritional Details:   -Food allergies: none  -Food sensitivities: none  -GI concerns: none really   -Appetite: mornings not hungry until 11:30 am. A bit more hungry for lunch, then gets hungry for dinner  -Pace of eating: pretty average - has a 1 hr lunch break, uses about 10-15 min to eat. Takes about 10 min for dinner as well  -Role of cooking: self  -Role of food shopping: self    Diet Recall: meal preps for whole week, tries to weigh out food and keep track of calories, tries to have low carb foods, 4 oz of meat each time  Breakfast: not really hungry, gets up around 7-7:15 am  Lunch: eats around 12 pm - ex. [place] wrap with hb egg  Dinner: around 8 pm - ex.  "jacket potato with 2 ounces of shredded meat  Goes to bed around 11-12 pm  Snack: doesn't snack really - sometimes has handful of chocolate chips  Beverages: lots of water, tries to have a protein drink - coffee with sometimes, zero-calorie electrolyte powder to water  Dining out: once a week usually for dinner, doesn't eat the entire meal, will bring home and finish the other half later. May get a kid's meal from "[employer] or [employer], otherwise Mexican, hamburger, or sushi    *Intake compared to MyPlate  Needs to have more vegetables and fruits - 1/4 if not half currently (?)  *fruits: eats ones that keep well ex. grapes, oranges, blueberries, melon, dislikes bananas and apples, strawberries go bad quickly  *vegetables: broccoli, green beans, cauliflower rice, zucchini noodles, potatoes sometimes, dislikes salad  *grains: has low carb tortillas 6 g CHO, half zuccini pasta and half actual pasta (ex. protein pasta or quinoa noodles), not really any bread, will have 1/2 oliva or long-grain rice and half cauliflower rice  *protein: lean ground beef, chicken, shrimp, fish, shredded pork, steak sometimes    *uses an sri to track calories - 1,400 kcal-1,600 kcal, tries to keep it on the lower end    Physical Activity:  Days per week: 4-5 times  Duration: 45 min - 1 hr  Activity type: weight lifting, has a stepper at home (30 min daily)  Limitations: some low back pain when lifting, works with  for this    NUTRITION FOCUSED PHYSICAL ASSESSMENT (NFPA) FOR DIAGNOSING MALNUTRITION  No: unable to adequately assess due to video visit          Observed: unable to adequately assess due to video visit     Obtained from Chart/Interdisciplinary Team: none noted    LABS  Labs reviewed - no recent concerns     MEDICATIONS  Medications reviewed - allegra    ANTHROPOMETRICS   Height: 1.6 m (5' 3\")  Weight: 79.8 kg (175 lbs)  BMI (kg/m2): 31.16   Weight Status: Obesity Grade I BMI 30-34.9  IBW: 115 lbs  ADJ BW: 130 lbs  %IBW: " 152  Weight History:   Wt Readings from Last 15 Encounters:   03/04/24 79.8 kg (175 lb 14.4 oz)   04/10/23 85.3 kg (188 lb)   04/03/23 85.7 kg (189 lb)   02/13/23 84.8 kg (187 lb)   04/26/21 73.5 kg (162 lb)   07/15/20 78.9 kg (174 lb)   12/16/19 77.7 kg (171 lb 6.4 oz)   04/24/19 78.2 kg (172 lb 6.4 oz)   04/22/19 79 kg (174 lb 3.2 oz)   02/26/19 77.6 kg (171 lb 1.6 oz)   10/23/18 80.4 kg (177 lb 4.8 oz)   06/19/17 71.3 kg (157 lb 1.6 oz)   10/23/16 68.3 kg (150 lb 9.6 oz)   06/26/16 68 kg (150 lb)   01/05/15 68.9 kg (152 lb) (84%, Z= 1.00)*     * Growth percentiles are based on CDC (Girls, 2-20 Years) data.   Recent loss - presumed intentional due to desire for weight management.     UBW? Changes to your weight recently?  At gym, does body comp - 184 lbs Nov 2022, pretty stable there. Last month - early this month, down to 169.6 lbs. Aiming for general loss, feeling healthier, toning up (?)    Dosing weight: 59.1 kg using ADJ BW    ASSESSED NUTRITION NEEDS  Estimated Energy Needs: 1,478-1,773 kcals/day (25-30 Kcal/Kg)  Justification: (obese)  Estimated Protein Needs: 47-59 grams protein/day (0.8-1 g pro/Kg)  Justification: (preservation of lean body mass)  Estimated Fluid Needs: 1,773 mL/day (30 mL/kg)    ASSESSED MALNUTRITION STATUS  % Weight Loss: Weight loss does not meet criteria for malnutrition - if present, presumed intentional due to desire for weight management   % Intake: Decreased intake does not meet criteria for malnutrition  - if present, presumed intentional due to desire for weight management   Subcutaneous Fat Loss: Unable to adequately assess  Loss of Muscle Mass: Unable to adequately assess  Fluid Retention: None noted    Malnutrition Diagnosis: Unable to determine due to lack of adequate NFPE related to video visit    DIAGNOSIS   Nutrition Diagnosis: Food and nutrition-related knowledge deficit related to weight management as evidenced by referral to meet with RD      INTERVENTIONS   Nutrition  Prescription: general, healthy nutrition recommendations following a whole foods approach, focusing on MyPlate method for guidance. Ensuring intake of plenty of fruits and vegetables, of a variety of colors and types to promote antioxidant, vitamin/mineral intake. Focusing on lean proteins and avoiding foods that are high in saturated, trans fat. Implementing plenty of plant-based proteins such as nuts, seeds, legumes, and beans. Importance of WGs to promote bowel regularity via insoluble fiber intake. Soluble fiber regularly to help lower cholesterol levels. Focusing on reducing overall refined sugar, high calorie foods. Mindfulness with eating to determine when eating, why, how much.       IMPLEMENTATION   Assessed learning needs and learning preference: N/A  Teaching Method(s) used: Booklet / Handout  Explanation    Nutrition Education (Content):              a)  Discussed: went over pt's usual intake, overall dietary patterns. Intake compared to MyPlate. Metabolism. Etc.               b)  Provided the following handouts: Eat Right with MyPlate, 20 Ways to Enjoy More Fruits and Vegetables, Snacks, Healthy Snack List, DIETITIAN APPROVED BARS    This link: [email or web address]/wellness/weight-[email or web address]-body-[email or web address]/[email or web address]-myths-[email or web address]-facts     Nutrition Education (Application):              a)  Discussed current eating plans and/or recommended alternative food choices              b)  Patient verbalizes understanding of diet by creating goals that reflect diet recommendations and offering no additional questions or concerns at the end of the meeting     Anticipate good compliance   Stage of Change: action mostly  Additional: is working on losing weight, open to additional information    GOALS  N/A    FOLLOW UP/MONITORING   Progress towards goals will be monitored and evaluated per protocol and Practice Guidelines    Time Spent with Patient  Approx. 30 minutes    Lillian Hurt RD, LD  Clinical  Dietitian  Office: [phone]  Weekend pager: [phone]

## 2024-08-12 ENCOUNTER — HOSPITAL ENCOUNTER (OUTPATIENT)
Dept: NUTRITION | Facility: CLINIC | Age: 29
Discharge: HOME | End: 2024-08-12
Attending: NURSE PRACTITIONER | Admitting: NURSE PRACTITIONER
Payer: COMMERCIAL

## 2024-08-12 PROCEDURE — 97803 MED NUTRITION INDIV SUBSEQ: CPT | Mod: GT,95

## 2024-08-12 NOTE — PROGRESS NOTES
OUTPATIENT CLINICAL NUTRITION SERVICES FOLLOW-UP     Video-Visit Details     Type of service: Video Visit     Video Start Time (time video started): 11:00 am     Video End Time (time video stopped): 11:10 am    Originating Location (pt. Location): Home      Distant Location (provider location): On-site     Mode of Communication: Video Conference via [employer]     REASON FOR ASSESSMENT  Clayton العلي referred by Jaylin Mauro APRN CNP for MNT related to  Obesity (BMI 30-39.9) [E66.9]     Weight Loss - Overweight/Obesity     Patient accompanied by: N/A      NEW FINDINGS:   Pt reports everything has been going well, has been continuing to work on healthy eating and exercise.     Biggest change was adding in a breakfast - usually has a 200 kcal protein bar, with 20 grams of protein.     Cut out caffeine to once a week    Has been bumping up activity, goes to the gym every day and 30 minutes of stepper at home. Started seeing a chiropractor.    Lunch and dinner intakes vary:  - lunch may be low CHO tortilla wraps with a side of veggies, burger with ground turkey  - dinner may be beef and broccoli noodle dish, [employer]-similar meal with no rice (meat, toppings, lettuce, protein chips)    Is wondering about lipedema (?) and high cortisol, if this can impact weight. Reports being stressed at work.    Previous Nutrition Goals:  N/A     Previous Nutrition Follow Up / Monitoring:  Progress towards goals will be monitored and evaluated per protocol and Practice Guidelines    Previous Nutrition Diagnosis:  Food and nutrition-related knowledge deficit related to weight management as evidenced by referral to meet with RD    Evaluation: Improving    Newest Food Record  N/A - please see above for additional details.    Physical Activity:  Days per week: 4-5 times  Duration: 45 min - 1 hr  Activity type: weight lifting, has a stepper at home (30 min daily)  Limitations: some low back pain when lifting, works with   "for this  *did not discuss at follow-up unless pt noted.    ANTHROPOMETRICS   Height: 1.6 m (5' 3\")  Weight: 79.8 kg (175 lbs)  BMI (kg/m2): 31.16   Weight Status: Obesity Grade I BMI 30-34.9  IBW: 115 lbs  ADJ BW: 130 lbs  %IBW: 152  Weight History:       Wt Readings from Last 15 Encounters:   03/04/24 79.8 kg (175 lb 14.4 oz)   04/10/23 85.3 kg (188 lb)   04/03/23 85.7 kg (189 lb)   02/13/23 84.8 kg (187 lb)   04/26/21 73.5 kg (162 lb)   07/15/20 78.9 kg (174 lb)   12/16/19 77.7 kg (171 lb 6.4 oz)   04/24/19 78.2 kg (172 lb 6.4 oz)   04/22/19 79 kg (174 lb 3.2 oz)   02/26/19 77.6 kg (171 lb 1.6 oz)   10/23/18 80.4 kg (177 lb 4.8 oz)   06/19/17 71.3 kg (157 lb 1.6 oz)   10/23/16 68.3 kg (150 lb 9.6 oz)   06/26/16 68 kg (150 lb)   01/05/15 68.9 kg (152 lb) (84%, Z= 1.00)*      * Growth percentiles are based on CDC (Girls, 2-20 Years) data.   Recent loss - presumed intentional due to desire for weight management.      UBW? Changes to your weight recently?  At gym, does body comp - 184 lbs Nov 2022, pretty stable there. Last month - early this month, down to 169.6 lbs. Aiming for general loss, feeling healthier, toning up (?)    Update at follow-up: pt reports losing weight to 160s range but hitting a plateau.     Dosing weight: 59.1 kg using ADJ BW     ASSESSED NUTRITION NEEDS  Estimated Energy Needs: 1,478-1,773 kcals/day (25-30 Kcal/Kg)  Justification: (obese)  Estimated Protein Needs: 47-59 grams protein/day (0.8-1 g pro/Kg)  Justification: (preservation of lean body mass)  Estimated Fluid Needs: 1,773 mL/day (30 mL/kg)    DIAGNOSIS   Nutrition Diagnosis: Food and nutrition-related knowledge deficit related to weight management as evidenced by referral to meet with RD      INTERVENTIONS   Nutrition Prescription: general, healthy nutrition recommendations following a whole foods approach, focusing on MyPlate method for guidance. Ensuring intake of plenty of fruits and vegetables, of a variety of colors and types to " promote antioxidant, vitamin/mineral intake. Focusing on lean proteins and avoiding foods that are high in saturated, trans fat. Implementing plenty of plant-based proteins such as nuts, seeds, legumes, and beans. Importance of WGs to promote bowel regularity via insoluble fiber intake. Soluble fiber regularly to help lower cholesterol levels. Focusing on reducing overall refined sugar, high calorie foods. Mindfulness with eating to determine when eating, why, how much.       IMPLEMENTATION   Assessed learning needs and learning preference: N/A  Teaching Method(s) used: Booklet / Handout  Explanation    Nutrition Education (Content):              a)  Discussed: went over how pt has been doing in-depth. Cortisol and weight. Etc.               b)  Provided the following handouts: N/A    This link: https://www.GSOUND.gov/GSOUND-kitchen/recipes    Nutrition Education (Application):              a)  Discussed current eating plans and/or recommended alternative food choices              b)  Patient verbalizes understanding of diet by creating goals that reflect diet recommendations and offering no additional questions or concerns at the end of the meeting     Anticipate great compliance   Stage of Change: action mostly  Additional: continues to work on adding in changes, maintaining consistency    GOALS  N/A    Keeping up with consistency     FOLLOW UP/MONITORING   Progress towards goals will be monitored and evaluated per protocol and Practice Guidelines    Time Spent with Patient  Approx. 10 minutes    Lillian Hurt RD, LD  Clinical Dietitian  Office: [phone]  Weekend pager: [phone]

## 2025-02-03 ENCOUNTER — PATIENT OUTREACH (OUTPATIENT)
Dept: CARE COORDINATION | Facility: CLINIC | Age: 30
End: 2025-02-03
Payer: COMMERCIAL

## 2025-02-17 ENCOUNTER — PATIENT OUTREACH (OUTPATIENT)
Dept: CARE COORDINATION | Facility: CLINIC | Age: 30
End: 2025-02-17
Payer: COMMERCIAL

## 2025-04-12 ENCOUNTER — HEALTH MAINTENANCE LETTER (OUTPATIENT)
Age: 30
End: 2025-04-12